# Patient Record
Sex: FEMALE | Race: WHITE
[De-identification: names, ages, dates, MRNs, and addresses within clinical notes are randomized per-mention and may not be internally consistent; named-entity substitution may affect disease eponyms.]

---

## 2019-06-29 ENCOUNTER — HOSPITAL ENCOUNTER (INPATIENT)
Dept: HOSPITAL 47 - EC | Age: 74
LOS: 3 days | Discharge: HOME | DRG: 690 | End: 2019-07-02
Payer: MEDICARE

## 2019-06-29 DIAGNOSIS — N39.0: Primary | ICD-10-CM

## 2019-06-29 DIAGNOSIS — Z87.891: ICD-10-CM

## 2019-06-29 DIAGNOSIS — Z88.6: ICD-10-CM

## 2019-06-29 DIAGNOSIS — E03.9: ICD-10-CM

## 2019-06-29 DIAGNOSIS — Z79.899: ICD-10-CM

## 2019-06-29 DIAGNOSIS — Z80.3: ICD-10-CM

## 2019-06-29 DIAGNOSIS — I10: ICD-10-CM

## 2019-06-29 DIAGNOSIS — Z79.890: ICD-10-CM

## 2019-06-29 DIAGNOSIS — Z87.442: ICD-10-CM

## 2019-06-29 DIAGNOSIS — Z79.01: ICD-10-CM

## 2019-06-29 DIAGNOSIS — K56.7: ICD-10-CM

## 2019-06-29 DIAGNOSIS — Z90.721: ICD-10-CM

## 2019-06-29 DIAGNOSIS — Z87.11: ICD-10-CM

## 2019-06-29 DIAGNOSIS — B96.20: ICD-10-CM

## 2019-06-29 DIAGNOSIS — Z90.79: ICD-10-CM

## 2019-06-29 DIAGNOSIS — Z90.710: ICD-10-CM

## 2019-06-29 DIAGNOSIS — K52.9: ICD-10-CM

## 2019-06-29 DIAGNOSIS — E11.9: ICD-10-CM

## 2019-06-29 DIAGNOSIS — Z82.49: ICD-10-CM

## 2019-06-29 DIAGNOSIS — E66.9: ICD-10-CM

## 2019-06-29 DIAGNOSIS — N83.201: ICD-10-CM

## 2019-06-29 DIAGNOSIS — Z80.8: ICD-10-CM

## 2019-06-29 DIAGNOSIS — I48.91: ICD-10-CM

## 2019-06-29 DIAGNOSIS — Z98.84: ICD-10-CM

## 2019-06-29 LAB
ALBUMIN SERPL-MCNC: 3.4 G/DL (ref 3.5–5)
ALP SERPL-CCNC: 114 U/L (ref 38–126)
ALT SERPL-CCNC: 14 U/L (ref 9–52)
ANION GAP SERPL CALC-SCNC: 9 MMOL/L
AST SERPL-CCNC: 25 U/L (ref 14–36)
BASOPHILS # BLD AUTO: 0 K/UL (ref 0–0.2)
BASOPHILS NFR BLD AUTO: 0 %
BUN SERPL-SCNC: 18 MG/DL (ref 7–17)
CALCIUM SPEC-MCNC: 8.6 MG/DL (ref 8.4–10.2)
CHLORIDE SERPL-SCNC: 109 MMOL/L (ref 98–107)
CO2 SERPL-SCNC: 23 MMOL/L (ref 22–30)
EOSINOPHIL # BLD AUTO: 0.3 K/UL (ref 0–0.7)
EOSINOPHIL NFR BLD AUTO: 3 %
ERYTHROCYTE [DISTWIDTH] IN BLOOD BY AUTOMATED COUNT: 4.15 M/UL (ref 3.8–5.4)
ERYTHROCYTE [DISTWIDTH] IN BLOOD: 14.9 % (ref 11.5–15.5)
GLUCOSE SERPL-MCNC: 105 MG/DL (ref 74–99)
HCT VFR BLD AUTO: 36.6 % (ref 34–46)
HGB BLD-MCNC: 11.8 GM/DL (ref 11.4–16)
LIPASE SERPL-CCNC: 106 U/L (ref 23–300)
LYMPHOCYTES # SPEC AUTO: 1.6 K/UL (ref 1–4.8)
LYMPHOCYTES NFR SPEC AUTO: 20 %
MCH RBC QN AUTO: 28.4 PG (ref 25–35)
MCHC RBC AUTO-ENTMCNC: 32.2 G/DL (ref 31–37)
MCV RBC AUTO: 88.2 FL (ref 80–100)
MONOCYTES # BLD AUTO: 0.8 K/UL (ref 0–1)
MONOCYTES NFR BLD AUTO: 10 %
NEUTROPHILS # BLD AUTO: 4.9 K/UL (ref 1.3–7.7)
NEUTROPHILS NFR BLD AUTO: 63 %
PH UR: 5 [PH] (ref 5–8)
PLATELET # BLD AUTO: 201 K/UL (ref 150–450)
POTASSIUM SERPL-SCNC: 4.3 MMOL/L (ref 3.5–5.1)
PROT SERPL-MCNC: 6.4 G/DL (ref 6.3–8.2)
RBC UR QL: 1 /HPF (ref 0–5)
SODIUM SERPL-SCNC: 141 MMOL/L (ref 137–145)
SP GR UR: 1.02 (ref 1–1.03)
SQUAMOUS UR QL AUTO: <1 /HPF (ref 0–4)
UROBILINOGEN UR QL STRIP: <2 MG/DL (ref ?–2)
WBC # BLD AUTO: 7.8 K/UL (ref 3.8–10.6)

## 2019-06-29 PROCEDURE — 87077 CULTURE AEROBIC IDENTIFY: CPT

## 2019-06-29 PROCEDURE — 87086 URINE CULTURE/COLONY COUNT: CPT

## 2019-06-29 PROCEDURE — 83690 ASSAY OF LIPASE: CPT

## 2019-06-29 PROCEDURE — 96374 THER/PROPH/DIAG INJ IV PUSH: CPT

## 2019-06-29 PROCEDURE — 87186 SC STD MICRODIL/AGAR DIL: CPT

## 2019-06-29 PROCEDURE — 36415 COLL VENOUS BLD VENIPUNCTURE: CPT

## 2019-06-29 PROCEDURE — 96361 HYDRATE IV INFUSION ADD-ON: CPT

## 2019-06-29 PROCEDURE — 85025 COMPLETE CBC W/AUTO DIFF WBC: CPT

## 2019-06-29 PROCEDURE — 74176 CT ABD & PELVIS W/O CONTRAST: CPT

## 2019-06-29 PROCEDURE — 99285 EMERGENCY DEPT VISIT HI MDM: CPT

## 2019-06-29 PROCEDURE — 74181 MRI ABDOMEN W/O CONTRAST: CPT

## 2019-06-29 PROCEDURE — 76705 ECHO EXAM OF ABDOMEN: CPT

## 2019-06-29 PROCEDURE — 76856 US EXAM PELVIC COMPLETE: CPT

## 2019-06-29 PROCEDURE — 96375 TX/PRO/DX INJ NEW DRUG ADDON: CPT

## 2019-06-29 PROCEDURE — 80053 COMPREHEN METABOLIC PANEL: CPT

## 2019-06-29 PROCEDURE — 81001 URINALYSIS AUTO W/SCOPE: CPT

## 2019-06-29 RX ADMIN — ONDANSETRON PRN MG: 2 INJECTION INTRAMUSCULAR; INTRAVENOUS at 23:50

## 2019-06-29 RX ADMIN — CEFAZOLIN SCH MLS/HR: 330 INJECTION, POWDER, FOR SOLUTION INTRAMUSCULAR; INTRAVENOUS at 18:59

## 2019-06-29 RX ADMIN — HYDROMORPHONE HYDROCHLORIDE PRN MG: 1 INJECTION, SOLUTION INTRAMUSCULAR; INTRAVENOUS; SUBCUTANEOUS at 22:37

## 2019-06-29 NOTE — CT
EXAMINATION TYPE: CT abdomen pelvis wo con

 

DATE OF EXAM: 6/29/2019

 

COMPARISON: None

 

HISTORY: right flank pain, hx of stones

 

CT DLP: 776 mGycm

Automated exposure control for dose reduction was used.

 

TECHNIQUE:  Helical acquisition of images was performed from the lung bases through the pelvis.

 

FINDINGS: 

 

LUNG BASES: Diffuse groundglass opacities are seen throughout with bibasilar atelectasis and scattere
d blebs. No hiatal hernia is also appreciated. Surgical change at the gastroesophageal junction is no
alex from prior Ashley-en-Y.

 

LIVER/GB: No bladder is surgically absent. There is prominence of the central biliary system. There i
s incomplete evaluation of the right hepatic dome.

 

PANCREAS: No significant abnormality is seen.

 

SPLEEN: No significant abnormality is seen.

 

ADRENALS: Slight thickening of the adrenal glands may relate to adrenal gland hyperplasia.

 

KIDNEYS: No hydronephrosis or nephrolithiasis. Ill-defined right upper pole renal lesion measures Venancio
nsfield units of fluid. This is incompletely characterized without contrast. Margins are not visible 
and cannot be accurately measured.

 

FREE AIR:  No free air is visualized

 

REPRODUCTIVE ORGANS: Right ovarian cystic lesion measures 2.7 cm and is unexpected in a postmenopausa
l female. Pelvic ultrasound is recommended for further characterization.

 

URINARY BLADDER:  Protrusion of the urinary bladder over the pubic ramus is incidentally seen. No saray
culi are seen within the urinary bladder.

 

PELVIC ADENOPATHY:  No greater than 1 cm short axis lymph node is seen in the abdomen or pelvis.

 

OSSEOUS STRUCTURES:  Diffuse osseous demineralization and degenerative changes of the spine and femor
al acetabular joints are present.

 

BOWEL:  Postsurgical changes from prior partial gastrectomy as described above. Numerous loops of pro
minent small bowel are seen with small bowel feces sign and few loops of bowel wall thickening in the
 left lower quadrant. Findings are nonspecific. Overall there is no evidence of obstruction within th
e bowel.

 

OTHER: Numerous subcutaneous varices are noted within the low pelvis and some degree of vascular occl
usion is suspected that is chronic. Postsurgical change of the ventral abdominal wall is also seen wi
th persistent diastases recti and a small supraumbilical ventral fat filled hernia. Extensive atheros
clerosis of the abdominal aorta and its branches are noted. Pelvic floor relaxation is noted.

 

IMPRESSION: 

1. NO EVIDENCE OF HYDRONEPHROSIS OR NEPHROLITHIASIS.

2. RIGHT OVARIAN LESION IS UNEXPECTED IN A PATIENT OF THIS AGE. BENIGN OR MALIGNANT CYSTIC OVARIAN NE
OPLASM IS POSSIBLE AND PELVIC ULTRASOUND IS RECOMMENDED FOR FURTHER CHARACTERIZATION ON A NONEMERGENT
 BASIS.

3. CENTRAL INTRAHEPATIC BILIARY DUCTAL DILATATION IS INCOMPLETELY VISUALIZED WITHOUT CONTRAST. MRCP W
ITH AND WITHOUT CONTRAST IS RECOMMENDED TO EVALUATE FOR OBSTRUCTING TUMOR OR POSSIBLE CHOLANGITIS IN 
THE APPROPRIATE CLINICAL SETTING.

4. SUBCUTANEOUS VARICES IN THE LOW PELVIS SUGGEST A DEGREE OF CHRONIC VASCULAR OCCLUSION.

5. SCATTERED LOOPS OF PROMINENT SMALL BOWEL WITH FINDINGS OF INCREASED TRANSIT TIME ALL INDICATIVE OF
 ILEUS. UNDERLYING ENTERITIS IS POSSIBLE.

6. GROUNDGLASS OPACITIES BILATERALLY CAN BE SEEN IN PNEUMONITIS, FLUID OVERLOAD, OR FIBROSIS.

## 2019-06-29 NOTE — US
EXAMINATION TYPE: US liver

 

DATE OF EXAM: 6/29/2019

 

COMPARISON: CT dated 6/29/2019

 

CLINICAL HISTORY: Pain. RUQ pain.  CT.  Patient doesn't know if she has a GB or not.  Not NPO.

 

***Limited exam due to overlying bowel gas

 

EXAM MEASUREMENTS:

 

Liver Length:  16.5 cm    

CHD:  1.3 cm

Right Kidney:  8.8 x 4.6 x 4.1  cm

 

 

 

Pancreas:  Obscured by bowel gas

Liver:  Scanned through ribs due to overlying bowel gas= suboptimal visualization.  Left lobe not vis
ualized.  Biliary dilatation seen at hilum.    

Gallbladder:  Surgically absent

**Evidence for sonographic Munoz's sign:  neg

CBD:  Obscured by overlying bowel gas 

CHD: dilated

Right Kidney:  No hydronephrosis or masses seen 

 

 

 

IMPRESSION: As seen on the CT of the same date there is biliary ductal dilatation at the hilum with t
he common hepatic duct measuring up to 1.3 cm. No obstructing calculus is seen on ultrasound. MRCP wi
th and without contrast are recommended for further evaluation to exclude obstructing Klatskin tumor.

## 2019-06-29 NOTE — ED
Abdominal Pain HPI





- General


Chief Complaint: Abdominal Pain


Stated Complaint: Flank pain


Time Seen by Provider: 06/29/19 15:22


Source: patient


Mode of arrival: ambulatory


Limitations: no limitations





- History of Present Illness


Initial Comments: 





Patient is a 73-year-old female presenting to the emergency Department with 

complaints of right flank pain and right upper quadrant pain on and off for 2-3 

days.  Patient states she has had kidney stones in the past and this feels 

similar.  Patient also admits to associated nausea.  Patient denies fever, ch

ills, vomiting, diarrhea, chest pain, shortness of breath.  Patient's last BM 

was this morning and was normal.  Patient does admit to history of hysterectomy,

tubal ligation, 2 gastric bypass surgeries, bladder suspension.  Patient is 

unsure if she has gallbladder and/or appendix.  Patient describes the pain as on

and off for the last 2-3 days but then today has been really constant, about 

8/10.  Patient states she took [place] at home and it did not help with the pain. 

Patient denies any urinary symptoms at this time.  No other complaints at this 

time.





- Related Data


                                Home Medications











 Medication  Instructions  Recorded  Confirmed


 


Amiodarone HCl 200 mg PO DAILY 07/30/16 06/29/19


 


HYDROcodone/APAP 10-325MG [Norco 1 tab PO BID 07/30/16 06/29/19





]   


 


Rivaroxaban [Xarelto] 20 mg PO DAILY 07/30/16 06/29/19


 


Levothyroxine Sodium [Synthroid] 100 mcg PO DAILY 10/29/17 06/29/19


 


Baclofen [Lioresal] 10 mg PO BID 06/29/19 06/29/19


 


Ergocalciferol (Vitamin D2) 50,000 unit PO WE 06/29/19 06/29/19





[Vitamin D2]   


 


Irbesartan [Avapro] 150 mg PO DAILY 06/29/19 06/29/19











                                    Allergies











Allergy/AdvReac Type Severity Reaction Status Date / Time


 


ibuprofen AdvReac  Unknown Verified 06/29/19 15:04














Review of Systems


ROS Statement: 


Those systems with pertinent positive or pertinent negative responses have been 

documented in the HPI.





ROS Other: All systems not noted in ROS Statement are negative.





Past Medical History


Past Medical History: Atrial Fibrillation, Atrial Flutter, Diabetes Mellitus, 

Hypertension, Thyroid Disorder


History of Any Multi-Drug Resistant Organisms: None Reported


Past Surgical History: Hysterectomy, Tubal Ligation


Additional Past Surgical History / Comment(s): gastric bypass


Past Psychological History: No Psychological Hx Reported


Smoking Status: Former smoker


Past Alcohol Use History: None Reported


Past Drug Use History: None Reported





General Exam





- General Exam Comments


Initial Comments: 





GENERAL: Well-appearing, well-nourished and in no acute distress, although 

appears uncomfortable.


HEAD: Atraumatic, normocephalic.


EYES: Pupils equal round and reactive to light, extraocular movements intact, 

sclera anicteric, conjunctiva are normal.


ENT: TMs normal, nares patent, oropharynx clear without exudates.  Moist mucous 

membranes.


NECK: Normal range of motion, supple without lymphadenopathy or JVD.


LUNGS: Breath sounds clear to auscultation bilaterally and equal.  No wheezes 

rales or rhonchi.


HEART: Regular rate and rhythm without murmurs, rubs or gallops.


ABDOMEN: Tender to palpation in the right flank and right upper quadrant.  Soft,

 normoactive bowel sounds.  No guarding, no rebound.  No masses appreciated.


: Deferred 


EXTREMITIES: Normal range of motion, no pitting or edema.  No clubbing or 

cyanosis.


NEUROLOGICAL: Cranial nerves II through XII grossly intact.  Normal speech, 

normal gait.


PSYCH: Normal mood, normal affect.


SKIN: Warm, Dry, normal turgor, no rashes or lesions noted.


Limitations: no limitations





Course


                                   Vital Signs











  06/29/19 06/29/19 06/29/19





  14:48 16:42 18:30


 


Temperature 97.3 F L  


 


Pulse Rate 65 57 L 


 


Respiratory 18 18 18





Rate   


 


Blood Pressure 164/79 152/61 158/75


 


O2 Sat by Pulse 100 97 96





Oximetry   














  06/29/19





  19:27


 


Temperature 98.0 F


 


Pulse Rate 56 L


 


Respiratory 18





Rate 


 


Blood Pressure 163/71


 


O2 Sat by Pulse 97





Oximetry 














Medical Decision Making





- Medical Decision Making





Patient is a 73-year-old female presenting to the ER with right upper quadrant 

and right flank pain 2-3 days.  Patient reports associated nausea as well.  

Patient denies fever, chills, chest pain, cough, shortness of breath, urinary 

symptoms.  Patient is afebrile.  On exam patient is tender in the right upper 

quadrant and right flank area. CBC, CMP is within normal limits.  UA reveals 

nitrate positive, small leukocyte Estrace, 7 wbc's.  Patient's CT abdomen 

reveals no hydronephrosis or kidney stones.  There is a right ovarian lesion 

that could be benign or malignant.  Radiologist recommends pelvic ultrasound is 

a nonemergent basis.  This was discussed with patient.  There is central 

intrahepatic biliary ductal dilation but is not well visualized on the CT.  

Radiologist recommends MRCP with and without contrast to evaluate for 

obstructing tumor or possible cholangitis.  Scattered loops of prominent small 

bowel with findings of increased his time indicative of ileus. Ultrasound of 

liver shows biliary ductal dilation at the hilum with the common hepatic duct 

measuring up to 1.3 cm.  No obstructing calculus can't be seen.  Case was disc

ussed with Dr. Gilliam.  Patient will be admitted under Dr. Sullivan with GI 

consult.  Patient and family are okay with this plan.





- Lab Data


Result diagrams: 


                                 06/29/19 16:30





                                 06/29/19 16:30


                                   Lab Results











  06/29/19 06/29/19 06/29/19 Range/Units





  16:30 16:30 16:38 


 


WBC  7.8    (3.8-10.6)  k/uL


 


RBC  4.15    (3.80-5.40)  m/uL


 


Hgb  11.8    (11.4-16.0)  gm/dL


 


Hct  36.6    (34.0-46.0)  %


 


MCV  88.2    (80.0-100.0)  fL


 


MCH  28.4    (25.0-35.0)  pg


 


MCHC  32.2    (31.0-37.0)  g/dL


 


RDW  14.9    (11.5-15.5)  %


 


Plt Count  201    (150-450)  k/uL


 


Neutrophils %  63    %


 


Lymphocytes %  20    %


 


Monocytes %  10    %


 


Eosinophils %  3    %


 


Basophils %  0    %


 


Neutrophils #  4.9    (1.3-7.7)  k/uL


 


Lymphocytes #  1.6    (1.0-4.8)  k/uL


 


Monocytes #  0.8    (0-1.0)  k/uL


 


Eosinophils #  0.3    (0-0.7)  k/uL


 


Basophils #  0.0    (0-0.2)  k/uL


 


Sodium   141   (137-145)  mmol/L


 


Potassium   4.3   (3.5-5.1)  mmol/L


 


Chloride   109 H   ()  mmol/L


 


Carbon Dioxide   23   (22-30)  mmol/L


 


Anion Gap   9   mmol/L


 


BUN   18 H   (7-17)  mg/dL


 


Creatinine   0.85   (0.52-1.04)  mg/dL


 


Est GFR (CKD-EPI)AfAm   78   (>60 ml/min/1.73 sqM)  


 


Est GFR (CKD-EPI)NonAf   68   (>60 ml/min/1.73 sqM)  


 


Glucose   105 H   (74-99)  mg/dL


 


Calcium   8.6   (8.4-10.2)  mg/dL


 


Total Bilirubin   0.3   (0.2-1.3)  mg/dL


 


AST   25   (14-36)  U/L


 


ALT   14   (9-52)  U/L


 


Alkaline Phosphatase   114   ()  U/L


 


Total Protein   6.4   (6.3-8.2)  g/dL


 


Albumin   3.4 L   (3.5-5.0)  g/dL


 


Lipase   106   ()  U/L


 


Urine Color    Yellow  


 


Urine Appearance    Clear  (Clear)  


 


Urine pH    5.0  (5.0-8.0)  


 


Ur Specific Gravity    1.019  (1.001-1.035)  


 


Urine Protein    Negative  (Negative)  


 


Urine Glucose (UA)    Negative  (Negative)  


 


Urine Ketones    Negative  (Negative)  


 


Urine Blood    Negative  (Negative)  


 


Urine Nitrite    Positive H  (Negative)  


 


Urine Bilirubin    Negative  (Negative)  


 


Urine Urobilinogen    <2.0  (<2.0)  mg/dL


 


Ur Leukocyte Esterase    Small H  (Negative)  


 


Urine RBC    1  (0-5)  /hpf


 


Urine WBC    7 H  (0-5)  /hpf


 


Ur Squamous Epith Cells    <1  (0-4)  /hpf


 


Urine Bacteria    Occasional H  (None)  /hpf


 


Urine Mucus    Rare H  (None)  /hpf














Disposition


Clinical Impression: 


 Abdominal pain, UTI (urinary tract infection)





Disposition: ADMITTED AS IP TO [facility] [place]


Condition: Stable


Instructions (If sedation given, give patient instructions):  Abdominal Pain 

(ED)


Is patient prescribed a controlled substance at d/c from ED?: No


Referrals: 


Naveed Sullivan MD [Primary Care Provider] - 1-2 days


Decision Date: 06/29/19


Decision Time: 18:25

## 2019-06-30 RX ADMIN — LOSARTAN POTASSIUM SCH MG: 50 TABLET, FILM COATED ORAL at 08:39

## 2019-06-30 RX ADMIN — CEFAZOLIN SCH MLS/HR: 330 INJECTION, POWDER, FOR SOLUTION INTRAMUSCULAR; INTRAVENOUS at 17:54

## 2019-06-30 RX ADMIN — BACLOFEN SCH MG: 10 TABLET ORAL at 20:09

## 2019-06-30 RX ADMIN — HYDROMORPHONE HYDROCHLORIDE PRN MG: 1 INJECTION, SOLUTION INTRAMUSCULAR; INTRAVENOUS; SUBCUTANEOUS at 07:44

## 2019-06-30 RX ADMIN — HYDROCODONE BITARTRATE AND ACETAMINOPHEN SCH: 10; 325 TABLET ORAL at 08:45

## 2019-06-30 RX ADMIN — LEVOTHYROXINE SODIUM SCH MCG: 100 TABLET ORAL at 05:41

## 2019-06-30 RX ADMIN — HYDROCODONE BITARTRATE AND ACETAMINOPHEN SCH: 10; 325 TABLET ORAL at 20:27

## 2019-06-30 RX ADMIN — HYDROMORPHONE HYDROCHLORIDE PRN MG: 1 INJECTION, SOLUTION INTRAMUSCULAR; INTRAVENOUS; SUBCUTANEOUS at 15:50

## 2019-06-30 RX ADMIN — AMIODARONE HYDROCHLORIDE SCH MG: 200 TABLET ORAL at 08:39

## 2019-06-30 RX ADMIN — BACLOFEN SCH: 10 TABLET ORAL at 08:39

## 2019-06-30 RX ADMIN — ONDANSETRON PRN MG: 2 INJECTION INTRAMUSCULAR; INTRAVENOUS at 09:40

## 2019-06-30 RX ADMIN — ONDANSETRON PRN MG: 2 INJECTION INTRAMUSCULAR; INTRAVENOUS at 17:59

## 2019-06-30 NOTE — CONS
CONSULTATION



DATE OF DICTATION:

06/30/2019



REQUESTING PHYSICIAN:

Dr. Sullivan.



REASON FOR CONSULTATION:

Severe right flank pain.



The patient is a 73 -year-old pleasant white female came to the emergency room

yesterday complaining of severe right flank pain radiating to the right upper quadrant

area for the last 4 days duration.  The patient states that she was doing a lot of

household work prior to this episode, scrubbing the floors and cleaning the house and a

day later she started experiencing some right-sided flank pain.  Initially thought it

was mostly musculoskeletal in nature and she took rest for a couple of days, but

continued to have persistent pain and now it became intense radiating to the right

upper quadrant area associated with some nausea but no emesis.  She became concerned,

came into the emergency room and subsequently admitted to the hospital for further

evaluation.



In the ER, she did have a CT of the abdomen and pelvis done that showed no evidence of

nephrolithiasis or hydronephrosis.  A small right ovarian lesion 2.5 cm noted.  There

was central intrahepatic biliary ductal dilation, but no CBD dilation noted, scattered

loops of prominent small bowel loops suspicious for enteritis.



The patient denies any nausea, vomiting.  Reports no rectal bleeding or melena.  She

denies any change in her bowel habits.  Her symptoms have no change with eating or

defecation.  She never had these symptoms in the past.



PAST MEDICAL HISTORY:

Significant for obesity, hypertension, hypothyroidism, atrial fibrillation on Xarelto,

diabetes mellitus, hypothyroidism.



PAST SURGICAL HISTORY:

Hysterectomy, tubal ligation, and gastric bypass surgery.



MEDICATIONS:

At home include amiodarone, Norco, Synthroid, Xarelto, Lioresal, Avapro, vitamin D2.



ALLERGIES:

IBUPROFEN.



SOCIAL HISTORY:

No smoking.  No alcohol use.



FAMILY HISTORY:

Unremarkable.



REVIEW OF SYSTEMS:

Cardiopulmonary: No chest pain, shortness of breath.

Genitourinary:  No dysuria or hematuria.  Musculoskeletal unremarkable.  Skin

unremarkable.  Endocrine unremarkable.  Psychiatric unremarkable.  Neurology

unremarkable.  ENT/vision unremarkable.  Constitutional:  No recent weight loss.  No

fever, chills, night sweats.



PHYSICAL EXAMINATION:

She appears comfortable.  No apparent distress.  VITAL SIGNS:  Stable.  Blood pressure

163/71, pulse rate 56, temperature 98.

HEENT examination unremarkable.  Conjunctivae pink. Sclerae anicteric.  Oral cavity no

lesions.

NECK: No jugular venous distention or lymph node enlargement.

CHEST: Clear to auscultation.

HEART:  Regular rate and rhythm.

ABDOMEN soft.  Bowel sounds are positive.  No organomegaly.

EXTREMITIES:  No pedal edema.

SKIN no rashes.

NEUROLOGIC:  Alert and oriented x3.  No focal deficits.



LABS:

From yesterday: WBC 7.3, hemoglobin 11.8, platelets are normal.  Basic metabolic panel

is within normal limits.  ALT, AST, T-bilirubin, alkaline phosphatase normal.  Lipase

is normal.  Urinalysis showed positive nitrate.



IMPRESSION:

1. This lady who presents with severe right flank pain radiating to right upper

    quadrant area for the last 3-4 days duration.  Her symptoms are more

    musculoskeletal in etiology.

2. Dilated intrahepatics with common hepatic duct measuring 1.5 cm on recent imaging

    studies. CT scan of the abdomen as well as ultrasound of the abdomen revealed the

    same.  However, her serum transaminases are within normal limits.  Doubt biliary

    obstruction.  However, as per Radiology, MRCP was recommended to investigate this

    further.



RECOMMENDATIONS:

1. We will schedule the patient for an MRCP to investigate this further.

2. Repeat labs in the morning.

3. Continue with symptomatic treatment for the right flank pain for possible

    musculoskeletal etiology.

4. No indication for any endoscopy intervention at the present time.

5. We will follow the patient closely during the hospital stay.

Thank you for this consultation.



AMYL / YONATANN: [id number] / Job#: [id number]

## 2019-06-30 NOTE — US
EXAMINATION TYPE: US pelvic complete

 

DATE OF EXAM: 6/30/2019

 

COMPARISON: NONE

 

CLINICAL HISTORY: 73-year-old female right ovarian lesion. CT showed rt ov cyst, patient states parti
al hysterectomy and thinks they took left ovary also. 

 

TECHNIQUE:  TA.  Transabdominal sonographic images of the pelvis were acquired.  

Date of LMP:  30+yrs

 

FINDINGS:

 

EXAM MEASUREMENTS:

 

Uterus:  Surgically absent 

Right Ovary:  5.1 x 2.6 x 3.2 cm

Left Ovary:  Surgically absent 

 

Sonographer notes:*had minutes to take images due to full bladder and patient's inability to hold it 
much longer.

 

1. Uterus: Surgically absent

2. Endometrium:  Surgically absent

3. Right Ovary:  2.7 x 1.8 x 2.3cm cyst seen.

4. Left Ovary:  Surgically absent

5. Bilateral Adnexa:  wnl

6. Posterior cul-de-sac:  wnl

 

 

 

IMPRESSION: 

1. Status post hysterectomy. Left ovary also seems to be surgically absent per history.

2. A 2.7 cm simple cyst of the right ovary. In a postmenopausal female, consensus guidelines recommen
ds annual ultrasound surveillance.

## 2019-06-30 NOTE — P.HPIM
History of Present Illness


H&P Date: 06/30/19


Chief Complaint: Abdominal pain





Shantell Lee is a 73-year-old female who presented to [facility] emergency room with a chief complaint of right upper quadrant abdominal

pain and right flank pain she was evaluated in emergency room computed 

tomography scan of the abdomen and pelvis was done and revealed evidence of 

intrahepatic biliary ductal dilatation patient also had evidence of right 

ovarian lesion, she was admitted to medical floor GI consultation and gynecology

consultation was requested.  Patient also had evidence of urinary tract 

infection she was started on IV Rocephin.





Past Medical History


Past Medical History: Atrial Fibrillation, Atrial Flutter, Diabetes Mellitus, 

Hypertension, Osteoarthritis (OA), Thyroid Disorder


Additional Past Medical History / Comment(s): gastric ulcers


History of Any Multi-Drug Resistant Organisms: None Reported


Past Surgical History: Bladder Surgery, Hysterectomy, Tubal Ligation


Additional Past Surgical History / Comment(s): gastric bypassx2, bladder 

suspension.


Past Psychological History: No Psychological Hx Reported


Smoking Status: Former smoker


Past Alcohol Use History: None Reported


Past Drug Use History: None Reported





- Past Family History


  ** Father


Family Medical History: Cancer, CVA/TIA, Myocardial Infarction (MI)





  ** Mother


Family Medical History: Myocardial Infarction (MI)





  ** Sister(s)


Family Medical History: Cancer, Congestive Heart Failure (CHF)


Additional Family Medical History / Comment(s): throat cancer, breast ca





Medications and Allergies


                                Home Medications











 Medication  Instructions  Recorded  Confirmed  Type


 


Amiodarone HCl 200 mg PO DAILY 07/30/16 06/29/19 History


 


HYDROcodone/APAP 10-325MG [Norco 1 tab PO BID 07/30/16 06/29/19 History





]    


 


Rivaroxaban [Xarelto] 20 mg PO DAILY 07/30/16 06/29/19 History


 


Levothyroxine Sodium [Synthroid] 100 mcg PO DAILY 10/29/17 06/29/19 History


 


Baclofen [Lioresal] 10 mg PO BID 06/29/19 06/29/19 History


 


Ergocalciferol (Vitamin D2) 50,000 unit PO WE 06/29/19 06/29/19 History





[Vitamin D2]    


 


Irbesartan [Avapro] 150 mg PO DAILY 06/29/19 06/29/19 History








                                    Allergies











Allergy/AdvReac Type Severity Reaction Status Date / Time


 


ibuprofen AdvReac  Unknown Verified 06/29/19 15:04














Physical Exam


Vitals: 


                                   Vital Signs











  Temp Pulse Pulse Resp BP BP BP


 


 06/30/19 13:27  98.0 F   57 L  16   149/71 


 


 06/30/19 05:00  97.4 F L   58 L  20   127/73 


 


 06/29/19 22:15  97.8 F   66  20    157/83


 


 06/29/19 21:48  97.9 F  62   16  128/55  


 


 06/29/19 19:27  98.0 F  56 L   18  163/71  


 


 06/29/19 18:30     18  158/75  


 


 06/29/19 16:42   57 L   18  152/61  














  Pulse Ox


 


 06/30/19 13:27  96


 


 06/30/19 05:00  98


 


 06/29/19 22:15  97


 


 06/29/19 21:48  97


 


 06/29/19 19:27  97


 


 06/29/19 18:30  96


 


 06/29/19 16:42  97








                                Intake and Output











 06/30/19 06/30/19 06/30/19





 06:59 14:59 22:59


 


Intake Total 100 750 


 


Balance 100 750 


 


Intake:   


 


  Oral 100 750 


 


Other:   


 


  Voiding Method Toilet Toilet 


 


  # Voids 3 2 

















In general patient is alert and oriented 3 in no apparent distress


HEENT head normocephalic and atraumatic


Neck is supple no JVD no goiter no lymphadenopathy


Chest exam reveals a few scattered rhonchi no wheezing


Cardiac exam reveals regular heart sounds no gallops no murmurs


Abdomen is soft nontender no organomegaly was normal bowel sounds


Extremity exam reveals no edema no cyanosis or clubbing


Neurological examination reveals no gross focal deficit





Results


CBC & Chem 7: 


                                 06/29/19 16:30





                                 06/29/19 16:30


Labs: 


                  Abnormal Lab Results - Last 24 Hours (Table)











  06/29/19 06/29/19 Range/Units





  16:30 16:38 


 


Chloride  109 H   ()  mmol/L


 


BUN  18 H   (7-17)  mg/dL


 


Glucose  105 H   (74-99)  mg/dL


 


Albumin  3.4 L   (3.5-5.0)  g/dL


 


Urine Nitrite   Positive H  (Negative)  


 


Ur Leukocyte Esterase   Small H  (Negative)  


 


Urine WBC   7 H  (0-5)  /hpf


 


Urine Bacteria   Occasional H  (None)  /hpf


 


Urine Mucus   Rare H  (None)  /hpf














Thrombosis Risk Factor Assmnt





- Choose All That Apply


Any of the Below Risk Factors Present?: Yes


Each Factor Represents 1 point: Obesity (BMI >25)


Other Risk Factors: Yes


Each Risk Factor Represents 3 Points: Age 75 years or older


Other congenital or acquired thrombophilia - If yes, enter type in comment: No


Thrombosis Risk Factor Assessment Total Risk Factor Score: 4


Thrombosis Risk Factor Assessment Level: Moderate Risk





Assessment and Plan


Plan: 








#1 abdominal pain with nausea 


#2 urinary tract infection


#3 dilated biliary tree


#4 right ovarian lesion


#5 underlying history of hypothyroidism


#6 underlying history of atrial fibrillation maintained on supplemental


#7 underlying history of hypertension








At this time patient is admitted to medical, gastroenterology consultation was 

requested, MRCP was ordered


Patient was started on IV Rocephin for urinary tract infection


Gynecology consultation requested regarding right ovarian lesion


Continue current management will recheck in a.m.

## 2019-07-01 LAB
ALBUMIN SERPL-MCNC: 3.4 G/DL (ref 3.5–5)
ALP SERPL-CCNC: 152 U/L (ref 38–126)
ALT SERPL-CCNC: 47 U/L (ref 9–52)
ANION GAP SERPL CALC-SCNC: 7 MMOL/L
AST SERPL-CCNC: 50 U/L (ref 14–36)
BASOPHILS # BLD AUTO: 0 K/UL (ref 0–0.2)
BASOPHILS NFR BLD AUTO: 1 %
BUN SERPL-SCNC: 14 MG/DL (ref 7–17)
CALCIUM SPEC-MCNC: 8.6 MG/DL (ref 8.4–10.2)
CHLORIDE SERPL-SCNC: 108 MMOL/L (ref 98–107)
CO2 SERPL-SCNC: 26 MMOL/L (ref 22–30)
EOSINOPHIL # BLD AUTO: 0.2 K/UL (ref 0–0.7)
EOSINOPHIL NFR BLD AUTO: 4 %
ERYTHROCYTE [DISTWIDTH] IN BLOOD BY AUTOMATED COUNT: 4.12 M/UL (ref 3.8–5.4)
ERYTHROCYTE [DISTWIDTH] IN BLOOD: 15.4 % (ref 11.5–15.5)
GLUCOSE SERPL-MCNC: 91 MG/DL (ref 74–99)
HCT VFR BLD AUTO: 36.8 % (ref 34–46)
HGB BLD-MCNC: 11.7 GM/DL (ref 11.4–16)
LYMPHOCYTES # SPEC AUTO: 1.1 K/UL (ref 1–4.8)
LYMPHOCYTES NFR SPEC AUTO: 16 %
MCH RBC QN AUTO: 28.4 PG (ref 25–35)
MCHC RBC AUTO-ENTMCNC: 31.7 G/DL (ref 31–37)
MCV RBC AUTO: 89.5 FL (ref 80–100)
MONOCYTES # BLD AUTO: 0.6 K/UL (ref 0–1)
MONOCYTES NFR BLD AUTO: 9 %
NEUTROPHILS # BLD AUTO: 4.6 K/UL (ref 1.3–7.7)
NEUTROPHILS NFR BLD AUTO: 68 %
PLATELET # BLD AUTO: 206 K/UL (ref 150–450)
POTASSIUM SERPL-SCNC: 4.4 MMOL/L (ref 3.5–5.1)
PROT SERPL-MCNC: 6.3 G/DL (ref 6.3–8.2)
SODIUM SERPL-SCNC: 141 MMOL/L (ref 137–145)
WBC # BLD AUTO: 6.8 K/UL (ref 3.8–10.6)

## 2019-07-01 RX ADMIN — HYDROCODONE BITARTRATE AND ACETAMINOPHEN SCH EACH: 10; 325 TABLET ORAL at 21:46

## 2019-07-01 RX ADMIN — LOSARTAN POTASSIUM SCH MG: 50 TABLET, FILM COATED ORAL at 08:07

## 2019-07-01 RX ADMIN — LEVOTHYROXINE SODIUM SCH MCG: 100 TABLET ORAL at 07:01

## 2019-07-01 RX ADMIN — BACLOFEN SCH MG: 10 TABLET ORAL at 21:45

## 2019-07-01 RX ADMIN — HYDROCODONE BITARTRATE AND ACETAMINOPHEN SCH: 10; 325 TABLET ORAL at 08:09

## 2019-07-01 RX ADMIN — HYDROMORPHONE HYDROCHLORIDE PRN MG: 1 INJECTION, SOLUTION INTRAMUSCULAR; INTRAVENOUS; SUBCUTANEOUS at 17:30

## 2019-07-01 RX ADMIN — AMIODARONE HYDROCHLORIDE SCH MG: 200 TABLET ORAL at 08:07

## 2019-07-01 RX ADMIN — BACLOFEN SCH MG: 10 TABLET ORAL at 08:07

## 2019-07-01 RX ADMIN — CEFAZOLIN SCH MLS/HR: 330 INJECTION, POWDER, FOR SOLUTION INTRAMUSCULAR; INTRAVENOUS at 17:08

## 2019-07-01 NOTE — P.OBCN
History of Present Illness


Consult date: 07/01/19


Requesting physician: Naveed Sullivan


Reason for consult: ovarian cyst


Chief complaint: Right flank pain


History of present illness: 





This is a pleasant 73-year-old menopausal female that presented to the hospital 

yesterday with complaints of right flank pain patient states she noted the pain 

to be radiating from the right flank to the mid abdomen.  Patient states 

pressure alleviated this discomfort.  Patient denies any changes in 

bowel/urinary function.  Patient has a history of a hysterectomy done years ago 

for what sounds to be endometrial hyperplasia.  At the time total abdominal 

hysterectomy with unilateral salpingo-oophorectomy was performed.


Patient denies vaginal bleeding.  Patient states she is feeling much improved 

today and the pain has resolved.





Review of Systems


Constitutional: Denies chills, Denies fatigue, Denies fever


Cardiovascular: Denies chest pain


Respiratory: Denies dyspnea


Gastrointestinal: Reports nausea, Denies constipation, Denies diarrhea, Denies 

vomiting


Genitourinary: Denies dysuria, Denies urgency


Menstruation: Reports postmenopausal


Musculoskeletal: Denies low back pain





Past Medical History


Past Medical History: Atrial Fibrillation, Atrial Flutter, Diabetes Mellitus, 

Hypertension, Osteoarthritis (OA), Thyroid Disorder


Additional Past Medical History / Comment(s): gastric ulcers


History of Any Multi-Drug Resistant Organisms: None Reported


Past Surgical History: Bladder Surgery, Hysterectomy, Tubal Ligation


Additional Past Surgical History / Comment(s): gastric bypassx2, bladder 

suspension.


Past Psychological History: No Psychological Hx Reported


Smoking Status: Former smoker


Past Alcohol Use History: None Reported


Past Drug Use History: None Reported





- Past Family History


  ** Father


Family Medical History: Cancer, CVA/TIA, Myocardial Infarction (MI)





  ** Mother


Family Medical History: Myocardial Infarction (MI)





  ** Sister(s)


Family Medical History: Cancer, Congestive Heart Failure (CHF)


Additional Family Medical History / Comment(s): throat cancer, breast ca





Medications and Allergies


                                Home Medications











 Medication  Instructions  Recorded  Confirmed  Type


 


Amiodarone HCl 200 mg PO DAILY 07/30/16 06/29/19 History


 


HYDROcodone/APAP 10-325MG [Norco 1 tab PO BID 07/30/16 06/29/19 History





]    


 


Rivaroxaban [Xarelto] 20 mg PO DAILY 07/30/16 06/29/19 History


 


Levothyroxine Sodium [Synthroid] 100 mcg PO DAILY 10/29/17 06/29/19 History


 


Baclofen [Lioresal] 10 mg PO BID 06/29/19 06/29/19 History


 


Ergocalciferol (Vitamin D2) 50,000 unit PO WE 06/29/19 06/29/19 History





[Vitamin D2]    


 


Irbesartan [Avapro] 150 mg PO DAILY 06/29/19 06/29/19 History








                                    Allergies











Allergy/AdvReac Type Severity Reaction Status Date / Time


 


ibuprofen AdvReac  Unknown Verified 06/29/19 15:04














Exam


Osteopathic Statement: *.  No significant issues noted on an osteopathic 

structural exam other than those noted in the History and Physical/Consult.


                                   Vital Signs











  Temp Pulse Resp BP BP Pulse Ox


 


 07/01/19 05:00  97.6 F  60  20  164/72   98


 


 06/30/19 22:25  97.7 F  56 L  20   126/71  93 L


 


 06/30/19 13:27  98.0 F  57 L  16  149/71   96








                                Intake and Output











 06/30/19 07/01/19 07/01/19





 22:59 06:59 14:59


 


Intake Total 140 0 


 


Balance 140 0 


 


Intake:   


 


  Intake, IV Titration 140  





  Amount   


 


    Sodium Chloride 0.9% 1, 140  





    000 ml @ 40 mls/hr IV .   





    Q24H [facility] Rx#:[id number]   


 


  Oral  0 


 


Other:   


 


  Voiding Method  Toilet 


 


  # Voids 1 2 














Targeted physical exam was performed on this date in general this is a well-

nourished well-developed elderly female in no acute distress she notes 

nonlabored breathing her heart has a regular rate and rhythm her breasts are 

noted to be pendulous her abdomen is soft and nontender a vaginal exam is 

deferred at this time.





Results


Result Diagrams: 


                                 06/29/19 16:30





                                 06/29/19 16:30


                      Microbiology - Last 24 Hours (Table)











 06/29/19 16:38 Urine Culture - Preliminary





 Urine,Clean Catch 














Assessment and Plan


(1) Ovarian cyst


Current Visit: Yes   Status: Acute   Code(s): N83.209 - UNSPECIFIED OVARIAN 

CYST, UNSPECIFIED SIDE   SNOMED Code(s): 34780719


   





(2) Ovarian cyst, right


Current Visit: Yes   Status: Acute   Code(s): N83.201 - UNSPECIFIED OVARIAN 

CYST, RIGHT SIDE   SNOMED Code(s): 97769524


   


Plan: 





Ultrasound is reviewed in detail in general it is a 2.7 cm simple appearing 

cyst.  Recommendation per radiology is follow-up as an outpatient.  These 

results are discussed with the patient in detail.  Patient states understanding 

of the plan and is hopeful to be discharged home later today as her pain has 

resolved.


Thank you for the consult

## 2019-07-01 NOTE — MR
MRCP

 

HISTORY: Dilated common bile duct

 

Multiplanar multisequence imaging through the abdomen, three-dimensional reconstructions performed on
 an alternate workstation.

 

Correlation to CT abdomen 6/29/2019

 

The dilated biliary system is again noted as on CT. At the level of the distal common bile duct there
 is question of some abnormal soft tissue density at the level of the ampulla. Common bile duct is di
lated to approximately 16 mm. No luminal filling defect is identified. Patient is status post cholecy
stectomy.

 

Liver is enlarged. Signal drop on out of phase imaging within the liver suggests hepatic steatosis.

 

Probable cortical cyst associated with the right kidney measures 12 mm. The adrenal glands are normal
. Pancreas within normal limits. The spleen is normal. No retroperitoneal adenopathy. Aorta shows nor
mal caliber. Lung bases are clear. There is a hiatal hernia present. Postop changes are noted to the 
stomach.

 

IMPRESSION: Difficult to exclude an ampullary lesion on the basis of this exam. Biliary dilation can 
be due to patient's postcholecystectomy change. Postop images.

## 2019-07-01 NOTE — P.PN
Subjective


Progress Note Date: 07/01/19


Principal diagnosis: 


Flank pain





MRI scheduled for noon today.  Presently without nausea.  Resting comfortably.  

CBC within normal limits.  Afebrile.








Objective





- Vital Signs


Vital signs: 


                                   Vital Signs











Temp  97.6 F   07/01/19 05:00


 


Pulse  60   07/01/19 05:00


 


Resp  20   07/01/19 05:00


 


BP  164/72   07/01/19 05:00


 


Pulse Ox  98   07/01/19 05:00








                                 Intake & Output











 06/30/19 07/01/19 07/01/19





 18:59 06:59 18:59


 


Intake Total 750 140 


 


Balance 750 140 


 


Intake:   


 


  Intake, IV Titration  140 





  Amount   


 


    Sodium Chloride 0.9% 1,  140 





    000 ml @ 40 mls/hr IV .   





    Q24H [facility] Rx#:[id number]   


 


  Oral 750 0 


 


Other:   


 


  Voiding Method Toilet Toilet 


 


  # Voids 1 2 














- Exam


General appearance: The patient is alert, oriented, in no acute distress.


HET: Head is normocephalic and atraumatic.  Pupils are equal and reactive.  

Oropharynx is clear without lesions.


Neck: Supple without lymphadenopathy.  Trachea midline.


Heart: S1 S2.  Regular rate and rhythm.


Lungs: No crackles or wheezes are heard.


Abdomen: Soft, nontender, nondistended with  bowel sounds.  No peritoneal signs.

 No palpable organomegaly or masses.


Extremities: Normal skin color and turgor.  No cyanosis, rash, ulceration, 

clubbing, or edema.  Radial and pedal pulses are 2/4 bilaterally.


Neurological: No focal deficits.  Strength and sensation are grossly intact.








- Labs


CBC & Chem 7: 


                                 07/01/19 11:40





                                 06/29/19 16:30


Labs: 


                      Microbiology - Last 24 Hours (Table)











 06/29/19 16:38 Urine Culture - Preliminary





 Urine,Clean Catch 














Assessment and Plan


(1) Abnormal CT of the abdomen


Narrative/Plan: 


Severe right flank pain with CT abdomen reported dilated intrahepatic duct with 

common bile duct measuring 1.5 cm consistent with ultrasound of the abdomen with

normal transaminases.  Etiology unclear.


Current Visit: Yes   Status: Acute   Code(s): R93.5 - ABN FINDINGS ON DX IMAGING

OF ABD REGIONS, INC RETROPERITON   SNOMED Code(s): 80451330609277542


   





(2) Right flank pain


Current Visit: Yes   Status: Acute   Code(s): R10.9 - UNSPECIFIED ABDOMINAL PAIN

  SNOMED Code(s): 121872481


   


Plan: 


1.  MRCP.  Continue supportive measures.  No indication for endoscopic 

intervention at this time contingent on clinical course.  We'll follow with you.





Assessment and plan a care discussed with Dr. Zhong

## 2019-07-01 NOTE — P.PN
Subjective


Progress Note Date: 07/01/19





Shantell Lee is a 73-year-old female who presented to [facility] emergency room with a chief complaint of right upper quadrant abdominal

pain and right flank pain she was evaluated in emergency room computed 

tomography scan of the abdomen and pelvis was done and revealed evidence of i

ntrahepatic biliary ductal dilatation patient also had evidence of right ovarian

lesion, she was admitted to medical floor GI consultation and gynecology 

consultation was requested.  Patient also had evidence of urinary tract 

infection she was started on IV Rocephin.








On 07/01/2018 patient is alert and oriented 3.  Patient reports that she feels 

improved with her abdominal pain.  Patient denies any nausea or vomiting.  

Patient to undergo MRCP today.  Patient denies chest pain or shortness breath.  

Patient denies nausea vomiting or diarrhea.  Patient denies any urinary burning.

 Patient is maintained on Rocephin for UTI.  Patient was evaluated by OB/GYN for

ovarian lesion





Objective





- Vital Signs


Vital signs: 


                                   Vital Signs











Temp  97.6 F   07/01/19 05:00


 


Pulse  60   07/01/19 05:00


 


Resp  18   07/01/19 15:21


 


BP  164/72   07/01/19 05:00


 


Pulse Ox  98   07/01/19 05:00








                                 Intake & Output











 06/30/19 07/01/19 07/01/19





 18:59 06:59 18:59


 


Intake Total 750 140 


 


Balance 750 140 


 


Intake:   


 


  Intake, IV Titration  140 





  Amount   


 


    Sodium Chloride 0.9% 1,  140 





    000 ml @ 40 mls/hr IV .   





    Q24H [facility] Rx#:[id number]   


 


  Oral 750 0 


 


Other:   


 


  Voiding Method Toilet Toilet 


 


  # Voids 1 2 














- Exam





In general patient is alert and oriented 3 in no apparent distress


HEENT head normocephalic and atraumatic


Neck is supple no JVD no goiter no lymphadenopathy


Chest exam reveals a few scattered rhonchi no wheezing


Cardiac exam reveals regular heart sounds no gallops no murmurs


Abdomen is soft nontender no organomegaly was normal bowel sounds


Extremity exam reveals no edema no cyanosis or clubbing


Neurological examination reveals no gross focal deficit





- Labs


CBC & Chem 7: 


                                 07/01/19 11:40





                                 07/01/19 11:40


Labs: 


                  Abnormal Lab Results - Last 24 Hours (Table)











  07/01/19 Range/Units





  11:40 


 


Chloride  108 H  ()  mmol/L


 


AST  50 H  (14-36)  U/L


 


Alkaline Phosphatase  152 H  ()  U/L


 


Albumin  3.4 L  (3.5-5.0)  g/dL








                      Microbiology - Last 24 Hours (Table)











 06/29/19 16:38 Urine Culture - Preliminary





 Urine,Clean Catch 














Assessment and Plan


Assessment: 








#1 abdominal pain with nausea 


#2 urinary tract infection


#3 dilated biliary tree


#4 right ovarian lesion


#5 underlying history of hypothyroidism


#6 underlying history of atrial fibrillation maintained on coumadin


#7 underlying history of hypertension








At this time patient is admitted to medical, gastroenterology consultation was 

requested, MRCP was ordered


Patient was started on IV Rocephin for urinary tract infection


Patient was evaluated for right ovarian lesion by OB/GYN patient to follow-up 

outpatient





I performed an examination of the patient and discussed their management with 

the Nurse Practitioner.  I have reviewed the Nurse Practitioner's notes and 

agree with the documented findings and plan of care

## 2019-07-02 VITALS — DIASTOLIC BLOOD PRESSURE: 81 MMHG | TEMPERATURE: 98.1 F | HEART RATE: 55 BPM | SYSTOLIC BLOOD PRESSURE: 134 MMHG

## 2019-07-02 VITALS — RESPIRATION RATE: 16 BRPM

## 2019-07-02 LAB
ALBUMIN SERPL-MCNC: 3.1 G/DL (ref 3.5–5)
ALP SERPL-CCNC: 115 U/L (ref 38–126)
ALT SERPL-CCNC: 38 U/L (ref 9–52)
ANION GAP SERPL CALC-SCNC: 5 MMOL/L
AST SERPL-CCNC: 39 U/L (ref 14–36)
BASOPHILS # BLD AUTO: 0 K/UL (ref 0–0.2)
BASOPHILS NFR BLD AUTO: 1 %
BUN SERPL-SCNC: 14 MG/DL (ref 7–17)
CALCIUM SPEC-MCNC: 8.6 MG/DL (ref 8.4–10.2)
CHLORIDE SERPL-SCNC: 107 MMOL/L (ref 98–107)
CO2 SERPL-SCNC: 27 MMOL/L (ref 22–30)
EOSINOPHIL # BLD AUTO: 0.3 K/UL (ref 0–0.7)
EOSINOPHIL NFR BLD AUTO: 5 %
ERYTHROCYTE [DISTWIDTH] IN BLOOD BY AUTOMATED COUNT: 4.03 M/UL (ref 3.8–5.4)
ERYTHROCYTE [DISTWIDTH] IN BLOOD: 15.5 % (ref 11.5–15.5)
GLUCOSE SERPL-MCNC: 109 MG/DL (ref 74–99)
HCT VFR BLD AUTO: 36.5 % (ref 34–46)
HGB BLD-MCNC: 11.2 GM/DL (ref 11.4–16)
LYMPHOCYTES # SPEC AUTO: 1.2 K/UL (ref 1–4.8)
LYMPHOCYTES NFR SPEC AUTO: 21 %
MCH RBC QN AUTO: 27.9 PG (ref 25–35)
MCHC RBC AUTO-ENTMCNC: 30.8 G/DL (ref 31–37)
MCV RBC AUTO: 90.4 FL (ref 80–100)
MONOCYTES # BLD AUTO: 0.6 K/UL (ref 0–1)
MONOCYTES NFR BLD AUTO: 10 %
NEUTROPHILS # BLD AUTO: 3.5 K/UL (ref 1.3–7.7)
NEUTROPHILS NFR BLD AUTO: 61 %
PLATELET # BLD AUTO: 217 K/UL (ref 150–450)
POTASSIUM SERPL-SCNC: 4.5 MMOL/L (ref 3.5–5.1)
PROT SERPL-MCNC: 6 G/DL (ref 6.3–8.2)
SODIUM SERPL-SCNC: 139 MMOL/L (ref 137–145)
WBC # BLD AUTO: 5.7 K/UL (ref 3.8–10.6)

## 2019-07-02 RX ADMIN — LOSARTAN POTASSIUM SCH MG: 50 TABLET, FILM COATED ORAL at 07:59

## 2019-07-02 RX ADMIN — BACLOFEN SCH MG: 10 TABLET ORAL at 08:00

## 2019-07-02 RX ADMIN — AMIODARONE HYDROCHLORIDE SCH MG: 200 TABLET ORAL at 07:59

## 2019-07-02 RX ADMIN — HYDROCODONE BITARTRATE AND ACETAMINOPHEN SCH: 10; 325 TABLET ORAL at 08:01

## 2019-07-02 RX ADMIN — LEVOTHYROXINE SODIUM SCH MCG: 100 TABLET ORAL at 06:21

## 2019-07-02 NOTE — P.DS
Providers


Date of admission: 


06/29/19 19:22





Expected date of discharge: 07/02/19


Attending physician: 


Naveed Sullivan





Consults: 





                                        





06/29/19 18:22


Consult Physician Stat 


   Consulting Provider: Tyra Zhong


   Consult Reason/Comments: Right upper quadrant and flank pain, nausea, pain 

control


   Do you want consulting provider notified?: Yes, Notify in am





06/30/19 15:17


Consult Physician Routine 


   Consulting Provider: Chang Harley


   Consult Reason/Comments: right ovarian lesion


   Do you want consulting provider notified?: Yes











Primary care physician: 


Naveed Laurie





[facility] Course: 





Discharge diagnosis


#1 abdominal pain with nausea 


#2 urinary tract infection


#3 dilated biliary tree


#4 right ovarian lesion


#5 underlying history of hypothyroidism


#6 underlying history of atrial fibrillation maintained on coumadin


#7 underlying history of hypertension





Patient symptoms have resolved.





Pelvic ultrasound completed showing status post hysterectomy.  Left ovary also 

seems to be surgically absent per history.  2.7 cm simple cyst in the right 

ovary.  Guidelines recommend annual ultrasound surveillance in postmenopausal 

female.  Patient was evaluated by OB/GYN services, patient to follow-up 

outpatient for further management





MRCP completed showing difficult to exclude an ampullary lesion on the basis of 

this exam.  Biliary dilation can be due to patient's postcholecystectomy change.


Discussed case with GI services.  Patient has been cleared for discharge but 

patient will need EGD in 1 week patient does not need to stay in the hospital 

until EGD can be completed


Outpatient EGD has been scheduled to [facility] for 07/12/2019 per GI 

services








Hospital course





Shantell Lee is a 73-year-old female who presented to [facility] emergency room with a chief complaint of right upper quadrant abdominal

pain and right flank pain she was evaluated in emergency room computed 

tomography scan of the abdomen and pelvis was done and revealed evidence of 

intrahepatic biliary ductal dilatation patient also had evidence of right 

ovarian lesion, she was admitted to medical floor GI consultation and gynecology

consultation was requested.  Patient also had evidence of urinary tract 

infection she was started on IV Rocephin.








On 07/01/2018 patient is alert and oriented 3.  Patient reports that she feels 

improved with her abdominal pain.  Patient denies any nausea or vomiting.  

Patient to undergo MRCP today.  Patient denies chest pain or shortness breath.  

Patient denies nausea vomiting or diarrhea.  Patient denies any urinary burning.

 Patient is maintained on Rocephin for UTI.  Patient was evaluated by OB/GYN for

ovarian lesion





On 07/02/2019 patient is alert and oriented 3.  Patient's abdominal pain has 

significantly improved.  MRCP was completed which showed difficult disease to 

exclude an ampullary lesion on the basis of exam biliary dilation can be due to 

patient's postcholecystectomy change.  Discussed case with GI services.  Patient

will be scheduled for EGD outpatient on 07/12/2019.  EGD has been scheduled 

prior to discharge.  Patient's heart rate also sustained in the 50s.  Per 

patient her heart rate always is maintained in the 50s.  Patient has been seen 

by cardiology and has had a halter monitor in the past.  Patient is 

asymptomatic.  This time patient denies chest pain or shortness breath.  Patient

denies nausea vomiting or diarrhea.  Patient denies any urinary burning or 

frequency.  Patient will be discharged on Ceftin for one week for urinary tract 

infection.


 patient to hold Xaralto  until after EGD.  This was discussed with patient





I performed an examination of the patient and discussed their management with 

the Nurse Practitioner.  I have reviewed the Nurse Practitioner's notes and 

agree with the documented findings and plan of care


Patient Condition at Discharge: Stable





Plan - Discharge Summary


Discharge Rx Participant: No


New Discharge Prescriptions: 


No Action


   HYDROcodone/APAP 10-325MG [Norco ] 1 tab PO BID


   Amiodarone HCl 200 mg PO DAILY


   Rivaroxaban [Xarelto] 20 mg PO DAILY


   Levothyroxine Sodium [Synthroid] 100 mcg PO DAILY


   Irbesartan [Avapro] 150 mg PO DAILY


   Ergocalciferol (Vitamin D2) [Vitamin D2] 50,000 unit PO WE


   Baclofen [Lioresal] 10 mg PO BID


Discharge Medication List





Amiodarone HCl 200 mg PO DAILY 07/30/16 [History]


HYDROcodone/APAP 10-325MG [Norco ] 1 tab PO BID 07/30/16 [History]


Rivaroxaban [Xarelto] 20 mg PO DAILY 07/30/16 [History]


Levothyroxine Sodium [Synthroid] 100 mcg PO DAILY 10/29/17 [History]


Baclofen [Lioresal] 10 mg PO BID 06/29/19 [History]


Ergocalciferol (Vitamin D2) [Vitamin D2] 50,000 unit PO WE 06/29/19 [History]


Irbesartan [Avapro] 150 mg PO DAILY 06/29/19 [History]








Follow up Appointment(s)/Referral(s): 


Naveed Sullivan MD [Primary Care Provider] - 1-2 days


Patient Instructions/Handouts:  Abdominal Pain (ED)


Activity/Diet/Wound Care/Special Instructions: 


Outpatient EGD at [facility] July 12, 2019 presurgical 

screening will notify patient will additional instructions and arrival time

## 2019-07-02 NOTE — P.PN
Subjective


Progress Note Date: 07/02/19


Principal diagnosis: 


Flank pain





MRI MRCP difficult to exclude an ampullary lesion basis on the exam.  Biliary 

dilatation 16 mm could be secondary to patient's post cholecystectomy state.  No

luminal filling defect defect identified.  Presently without nausea.  Resting 

comfortably.  CBC within normal limits.  Afebrile.








Objective





- Vital Signs


Vital signs: 


                                   Vital Signs











Temp  97.8 F   07/02/19 04:37


 


Pulse  51 L  07/02/19 04:37


 


Resp  16   07/02/19 04:37


 


BP  135/73   07/02/19 04:37


 


Pulse Ox  99   07/02/19 04:37








                                 Intake & Output











 07/01/19 07/02/19 07/02/19





 18:59 06:59 18:59


 


Other:   


 


  # Voids 2 1 














- Exam


General appearance: The patient is alert, oriented, in no acute distress.


HET: Head is normocephalic and atraumatic.  Pupils are equal and reactive.  

Oropharynx is clear without lesions.


Neck: Supple without lymphadenopathy.  Trachea midline.


Heart: S1 S2.  Regular rate and rhythm.


Lungs: No crackles or wheezes are heard.


Abdomen: Soft, nontender, nondistended with  bowel sounds.  No peritoneal signs.

 No palpable organomegaly or masses.


Extremities: Normal skin color and turgor.  No cyanosis, rash, ulceration, 

clubbing, or edema.  Radial and pedal pulses are 2/4 bilaterally.


Neurological: No focal deficits.  Strength and sensation are grossly intact.








- Labs


CBC & Chem 7: 


                                 07/01/19 11:40





                                 07/01/19 11:40


Labs: 


                  Abnormal Lab Results - Last 24 Hours (Table)











  07/01/19 Range/Units





  11:40 


 


Chloride  108 H  ()  mmol/L


 


AST  50 H  (14-36)  U/L


 


Alkaline Phosphatase  152 H  ()  U/L


 


Albumin  3.4 L  (3.5-5.0)  g/dL








                      Microbiology - Last 24 Hours (Table)











 06/29/19 16:38 Urine Culture - Preliminary





 Urine,Clean Catch    Gram Neg Bacilli














Assessment and Plan


(1) Abnormal CT of the abdomen


Narrative/Plan: 


Severe right flank pain with CT abdomen reported dilated intrahepatic duct with 

common bile duct measuring 1.5 cm consistent with ultrasound of the abdomen with

normal transaminases.  Etiology unclear.  MRCP cannot exclude an ampullary 

lesion CBD dilation to 16 mm could be related to postcholecystectomy state.


Current Visit: Yes   Status: Acute   Code(s): R93.5 - ABN FINDINGS ON DX IMAGING

OF ABD REGIONS, INC RETROPERITON   SNOMED Code(s): 86944112523680583


   





(2) Right flank pain


Current Visit: Yes   Status: Acute   Code(s): R10.9 - UNSPECIFIED ABDOMINAL PAIN

  SNOMED Code(s): 855137223


   


Plan: 


1.  Outpatient diagnostic EGD advised to rule out ampullary mass we'll schedule 

for next week.  Patient is agreeable with this plan a care.  Discharge per 

medicine.


Assessment and plan a care discussed with Dr. Stevens

## 2019-07-12 ENCOUNTER — HOSPITAL ENCOUNTER (OUTPATIENT)
Dept: HOSPITAL 47 - ORWHC2ENDO | Age: 74
End: 2019-07-12
Attending: INTERNAL MEDICINE
Payer: MEDICARE

## 2019-07-12 VITALS — BODY MASS INDEX: 33.2 KG/M2

## 2019-07-12 VITALS — RESPIRATION RATE: 17 BRPM | SYSTOLIC BLOOD PRESSURE: 154 MMHG | DIASTOLIC BLOOD PRESSURE: 76 MMHG | HEART RATE: 61 BPM

## 2019-07-12 VITALS — TEMPERATURE: 97.8 F

## 2019-07-12 DIAGNOSIS — I48.91: ICD-10-CM

## 2019-07-12 DIAGNOSIS — K83.8: ICD-10-CM

## 2019-07-12 DIAGNOSIS — R93.5: Primary | ICD-10-CM

## 2019-07-12 DIAGNOSIS — Z79.890: ICD-10-CM

## 2019-07-12 DIAGNOSIS — Z90.710: ICD-10-CM

## 2019-07-12 DIAGNOSIS — M19.90: ICD-10-CM

## 2019-07-12 DIAGNOSIS — Z88.6: ICD-10-CM

## 2019-07-12 DIAGNOSIS — E07.9: ICD-10-CM

## 2019-07-12 DIAGNOSIS — Z79.899: ICD-10-CM

## 2019-07-12 DIAGNOSIS — Z79.01: ICD-10-CM

## 2019-07-12 LAB — GLUCOSE BLD-MCNC: 98 MG/DL (ref 75–99)

## 2019-07-12 PROCEDURE — 43235 EGD DIAGNOSTIC BRUSH WASH: CPT

## 2019-07-12 RX ADMIN — POTASSIUM CHLORIDE SCH MLS: 14.9 INJECTION, SOLUTION INTRAVENOUS at 08:45

## 2019-07-12 RX ADMIN — POTASSIUM CHLORIDE SCH MLS: 14.9 INJECTION, SOLUTION INTRAVENOUS at 08:04

## 2019-07-12 NOTE — P.PCN
Date of Procedure: 07/12/19


Procedure(s) Performed: 


BRIEF HISTORY: Patient is a 73-year-old, pleasant, female, scheduled for an 

upper endoscopy as a part of evaluation of abnormal MRCP that showed a 

questionable lesion in the ampulla.  She was recently admitted hospital with 

right flank pain radiating to the right upper quadrant area for 4 days duration.

 CT of abdomen showed biliary ductal dilation.  Subsequently an MRCP was 

performed.  It showed mild common bile duct dilation but soft tissue density 

noted in the ampullary area and hence she is scheduled for an upper endoscopy to

evaluate further.  The patient has history of gastric bypass surgery done 

several years ago.. 





PROCEDURE PERFORMED: Esophagogastroduodenoscopy.





PREOPERATIVE DIAGNOSIS: Abnormal MRCP showing soft tissue density in the 

ampulla/rule out ampullary neoplasm. 





IV sedation per anesthesia. 





PROCEDURE: After informed consent was obtained, the patient  was brought into 

the endoscopy unit. IV sedation was administered by Anesthesia under continuous 

monitoring. Initially the Olympus GIF-140 video endoscope was inserted into the 

mouth. Esophagus intubated without any difficulty. It was gradually advanced 

into the stomach and gastric pouch was identified and appeared normal.  There 

was evidence of Ashley-en-Y anastomosis.  The afferent loop was normal.  The scope

was advanced into the efferent opening was advanced almost 60 cm and was not 

able to advance further into the duodenum in a retrograde fashion.  The scope 

was then withdrawn into the gastric pouch and esophagus there appeared normal. 

The GE junction was located at 39 cm from the incisors. The esophagus appeared 

normal. There were no erosions or ulcerations seen and the patient tolerated the

procedure well. 





IMPRESSION: 


1.  Evidence of gastric bypass surgery with Ashley-en-Y anastomosis.


2.  Could not visualize duodenum or the ampulla because of gastric bypass 

surgery.





RECOMMENDATIONS: The findings of this examination were discussed with the 

patient as well as a family.  At this time will monitor her labs closely.

## 2021-05-27 ENCOUNTER — HOSPITAL ENCOUNTER (OUTPATIENT)
Dept: HOSPITAL 47 - RADMRIMAIN | Age: 76
Discharge: HOME | End: 2021-05-27
Attending: INTERNAL MEDICINE
Payer: MEDICARE

## 2021-05-27 DIAGNOSIS — M47.816: ICD-10-CM

## 2021-05-27 DIAGNOSIS — M47.817: ICD-10-CM

## 2021-05-27 DIAGNOSIS — M51.36: Primary | ICD-10-CM

## 2021-05-27 PROCEDURE — 72148 MRI LUMBAR SPINE W/O DYE: CPT

## 2021-05-27 NOTE — MR
EXAMINATION TYPE: MR lumbar spine wo con

 

DATE OF EXAM: 5/27/2021

 

COMPARISON: CT 6/29/2019

 

HISTORY: Low back pain, pt c/o pain in hip and right thigh down to knee for 3 months.

 

TECHNIQUE: 

Multiplanar, multisequence images of the lumbar spine were acquired.

 

L1-L2: There is a mild posterior disc bulge causing minimal anterior mass effect on the thecal sac. N
o significant foraminal encroachment.

 

L2-L3: There is facet arthropathy causing some posterior lateral mass effect on the thecal sac. Circu
mferential disc bulge causes mild anterior mass effect on the thecal sac. No significant foraminal en
croachment.

 

L3-L4: Posterior broad-based disc bulge causes mild anterior mass effect on the thecal sac, circumfer
ential extension endplate disc complex encroaches on the neural foramen on the right. Facet arthropat
hy with hypertrophy ligamentum flavum causes posterior lateral mass effect on the thecal sac.

 

L4-L5: Posterior broad-based disc bulge causes mild effacement of anterior thecal sac, circumferentia
l extension of endplate disc complex encroaches minimally on the inferior aspect of the neural forame
n on the right. There is facet arthropathy with hypertrophy of ligamentum flavum causing some posteri
or lateral mass effect on the thecal sac.

 

L5-S1: There is facet arthropathy change present. Small posterior disc bulge contacts anterior thecal
 sac and possibly proximal S1 nerve roots. No significant foraminal encroachment

 

Lumbar segments are intact.  No paraspinal masses are identified.  Conus medullaris has a normal appe
arance. Lumbar vertebral bodies show preserved height and alignment. Is multilevel spondylosis with e
ndplate discogenic marrow signal change, multi endplate Schmorl's node formation. Loss of disc height
 and signal is present at intervertebral levels especially L2-3, L3-4 and L4-5. Multilevel vacuum phe
nomenon present at intervertebral levels. There is no significant spinal stenosis. Cortical cyst asso
ciated with the right kidney upper pole.

 

IMPRESSION:

There is degenerative disc disease and facet arthropathy as described. No significant spinal stenosis
.

## 2022-03-01 ENCOUNTER — HOSPITAL ENCOUNTER (OUTPATIENT)
Dept: HOSPITAL 47 - RADXRMAIN | Age: 77
Discharge: HOME | End: 2022-03-01
Attending: INTERNAL MEDICINE
Payer: MEDICARE
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
## 2022-03-01 DIAGNOSIS — R06.02: ICD-10-CM

## 2022-03-01 DIAGNOSIS — S22.42XA: Primary | ICD-10-CM

## 2022-03-01 PROCEDURE — 71046 X-RAY EXAM CHEST 2 VIEWS: CPT

## 2022-03-01 NOTE — XR
EXAMINATION TYPE: XR chest 2V, XR ribs LT

 

DATE OF EXAM: 3/1/2022

 

COMPARISON: Chest x-ray October 29, 2017

 

HISTORY: Chest and left-sided rib pain after recent fall injury.

 

TECHNIQUE:  Frontal and lateral views of the chest are obtained. A frontal and oblique images left-si
ded ribs.

 

FINDINGS:  There is lateral left mid to lower lung linear scarring and/or atelectasis.  Right lung is
 clear. No pleural effusion or pneumothorax seen bilaterally. The cardiac silhouette size remains wit
hin normal limits with atherosclerotic change aortic knob.   The osseous structures are intact. Amara
cystectomy clips are redemonstrated.

 

Dedicated images of the left-sided ribs show acute minimally displaced fracture of the anterior third
 and fourth ribs. Overlying soft tissues unremarkable.

 

IMPRESSION:  

1. Acute minimally displaced fractures anterior left third and fourth ribs. There is adjacent left la
teral midlung linear atelectasis. No pneumothorax is seen.

## 2023-10-26 ENCOUNTER — HOSPITAL ENCOUNTER (OUTPATIENT)
Dept: HOSPITAL 47 - EC | Age: 78
Setting detail: OBSERVATION
LOS: 4 days | Discharge: HOME | End: 2023-10-30
Attending: INTERNAL MEDICINE | Admitting: INTERNAL MEDICINE
Payer: MEDICARE

## 2023-10-26 DIAGNOSIS — Z98.84: ICD-10-CM

## 2023-10-26 DIAGNOSIS — I48.21: ICD-10-CM

## 2023-10-26 DIAGNOSIS — Z79.890: ICD-10-CM

## 2023-10-26 DIAGNOSIS — Z88.6: ICD-10-CM

## 2023-10-26 DIAGNOSIS — Z87.891: ICD-10-CM
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
## 2023-10-26 DIAGNOSIS — E03.9: ICD-10-CM

## 2023-10-26 DIAGNOSIS — I10: ICD-10-CM

## 2023-10-26 DIAGNOSIS — E11.9: ICD-10-CM

## 2023-10-26 DIAGNOSIS — E78.5: ICD-10-CM

## 2023-10-26 DIAGNOSIS — Z86.73: ICD-10-CM

## 2023-10-26 DIAGNOSIS — Z79.899: ICD-10-CM

## 2023-10-26 DIAGNOSIS — Z79.01: ICD-10-CM

## 2023-10-26 DIAGNOSIS — Z86.19: ICD-10-CM

## 2023-10-26 DIAGNOSIS — S00.83XA: ICD-10-CM

## 2023-10-26 DIAGNOSIS — R55: Primary | ICD-10-CM

## 2023-10-26 LAB
ALBUMIN SERPL-MCNC: 3.3 G/DL (ref 3.5–5)
ALP SERPL-CCNC: 92 U/L (ref 38–126)
ALT SERPL-CCNC: 19 U/L (ref 4–34)
ANION GAP SERPL CALC-SCNC: 12 MMOL/L
APTT BLD: 32.6 SEC (ref 22–30)
AST SERPL-CCNC: 28 U/L (ref 14–36)
BASOPHILS # BLD AUTO: 0 K/UL (ref 0–0.2)
BASOPHILS NFR BLD AUTO: 0 %
BUN SERPL-SCNC: 25 MG/DL (ref 7–17)
CALCIUM SPEC-MCNC: 8.4 MG/DL (ref 8.4–10.2)
CHLORIDE SERPL-SCNC: 109 MMOL/L (ref 98–107)
CO2 SERPL-SCNC: 19 MMOL/L (ref 22–30)
EOSINOPHIL # BLD AUTO: 0.5 K/UL (ref 0–0.7)
EOSINOPHIL NFR BLD AUTO: 5 %
ERYTHROCYTE [DISTWIDTH] IN BLOOD BY AUTOMATED COUNT: 3.68 M/UL (ref 3.8–5.4)
ERYTHROCYTE [DISTWIDTH] IN BLOOD: 19.8 % (ref 11.5–15.5)
GLUCOSE SERPL-MCNC: 121 MG/DL (ref 74–99)
HCT VFR BLD AUTO: 30.9 % (ref 34–46)
HGB BLD-MCNC: 9.8 GM/DL (ref 11.4–16)
INR PPP: 1.5 (ref ?–1.2)
LYMPHOCYTES # SPEC AUTO: 1.8 K/UL (ref 1–4.8)
LYMPHOCYTES NFR SPEC AUTO: 18 %
MAGNESIUM SPEC-SCNC: 2.2 MG/DL (ref 1.6–2.3)
MCH RBC QN AUTO: 26.7 PG (ref 25–35)
MCHC RBC AUTO-ENTMCNC: 31.8 G/DL (ref 31–37)
MCV RBC AUTO: 84 FL (ref 80–100)
MONOCYTES # BLD AUTO: 0.9 K/UL (ref 0–1)
MONOCYTES NFR BLD AUTO: 9 %
NEUTROPHILS # BLD AUTO: 6.5 K/UL (ref 1.3–7.7)
NEUTROPHILS NFR BLD AUTO: 66 %
PLATELET # BLD AUTO: 255 K/UL (ref 150–450)
POTASSIUM SERPL-SCNC: 3.5 MMOL/L (ref 3.5–5.1)
PROT SERPL-MCNC: 6.3 G/DL (ref 6.3–8.2)
PT BLD: 15.7 SEC (ref 10–12.5)
SODIUM SERPL-SCNC: 140 MMOL/L (ref 137–145)
WBC # BLD AUTO: 9.9 K/UL (ref 3.8–10.6)

## 2023-10-26 PROCEDURE — 70551 MRI BRAIN STEM W/O DYE: CPT

## 2023-10-26 PROCEDURE — 36415 COLL VENOUS BLD VENIPUNCTURE: CPT

## 2023-10-26 PROCEDURE — 81001 URINALYSIS AUTO W/SCOPE: CPT

## 2023-10-26 PROCEDURE — 85610 PROTHROMBIN TIME: CPT

## 2023-10-26 PROCEDURE — 97166 OT EVAL MOD COMPLEX 45 MIN: CPT

## 2023-10-26 PROCEDURE — 85025 COMPLETE CBC W/AUTO DIFF WBC: CPT

## 2023-10-26 PROCEDURE — 85730 THROMBOPLASTIN TIME PARTIAL: CPT

## 2023-10-26 PROCEDURE — 71046 X-RAY EXAM CHEST 2 VIEWS: CPT

## 2023-10-26 PROCEDURE — 95816 EEG AWAKE AND DROWSY: CPT

## 2023-10-26 PROCEDURE — 70450 CT HEAD/BRAIN W/O DYE: CPT

## 2023-10-26 PROCEDURE — 93005 ELECTROCARDIOGRAM TRACING: CPT

## 2023-10-26 PROCEDURE — 80053 COMPREHEN METABOLIC PANEL: CPT

## 2023-10-26 PROCEDURE — 93880 EXTRACRANIAL BILAT STUDY: CPT

## 2023-10-26 PROCEDURE — 72125 CT NECK SPINE W/O DYE: CPT

## 2023-10-26 PROCEDURE — 99285 EMERGENCY DEPT VISIT HI MDM: CPT

## 2023-10-26 PROCEDURE — 93306 TTE W/DOPPLER COMPLETE: CPT

## 2023-10-26 PROCEDURE — 83735 ASSAY OF MAGNESIUM: CPT

## 2023-10-26 PROCEDURE — 84484 ASSAY OF TROPONIN QUANT: CPT

## 2023-10-26 PROCEDURE — 90471 IMMUNIZATION ADMIN: CPT

## 2023-10-26 PROCEDURE — 97162 PT EVAL MOD COMPLEX 30 MIN: CPT

## 2023-10-26 PROCEDURE — 94760 N-INVAS EAR/PLS OXIMETRY 1: CPT

## 2023-10-26 PROCEDURE — 90715 TDAP VACCINE 7 YRS/> IM: CPT

## 2023-10-26 RX ADMIN — ACETAMINOPHEN PRN MG: 500 TABLET ORAL at 16:08

## 2023-10-26 RX ADMIN — ATORVASTATIN CALCIUM SCH MG: 40 TABLET, FILM COATED ORAL at 21:28

## 2023-10-26 NOTE — XR
EXAMINATION TYPE: XR chest 2V

 

DATE OF EXAM: 10/26/2023 2:38 PM

 

COMPARISON: Chest radiographs from 3/1/2022

 

TECHNIQUE: XR chest 2V Frontal and lateral views of the chest.

 

CLINICAL INDICATION:Female, 77 years old with history of syncope; 

 

FINDINGS: 

Lungs/Pleura: There is no evidence of pleural effusion, focal consolidation, or pneumothorax.  

Pulmonary vascularity: Unremarkable.

Heart/mediastinum: Cardiomediastinal silhouette is unremarkable.  Atherosclerotic calcifications are 
seen in the aorta.

Musculoskeletal: No acute osseous pathology. Increased thoracic kyphosis.

Other: Loop recorder within the left anterior chest wall.

 

IMPRESSION: 

No acute cardiopulmonary disease/process.

## 2023-10-26 NOTE — ED
General Adult HPI





- General


Stated complaint: Syncope/Fall,on Thinners


Time Seen by Provider: 10/26/23 13:07


Source: patient, RN/MD, EMS, RN notes reviewed


Mode of arrival: EMS


Limitations: no limitations





- History of Present Illness


Initial comments: 





Patient is a pleasant 77-year-old female presenting to the emergency Department 

with reported syncope.  Patient does not recall the episode.  Patient reportedly

was at physical therapy when she passed out.  Patient reportedly did strike her 

head.  Patient does complain of mild discomfort left frontal.  Patient is on 

Xarelto.  Patient does not feel confused other than not remembering the episode.

 No neck or back pain.  No chest pain or dyspnea.  No abdominal pain.





- Related Data


                                Home Medications











 Medication  Instructions  Recorded  Confirmed


 


Amiodarone HCl 200 mg PO QAM 07/30/16 07/12/19


 


HYDROcodone/APAP 10-325MG [Norco 1 tab PO BID 07/30/16 07/12/19





]   


 


Levothyroxine Sodium [Synthroid] 100 mcg PO QAM 10/29/17 07/12/19


 


Baclofen [Lioresal] 10 mg PO BID 06/29/19 07/12/19


 


Ergocalciferol (Vitamin D2) 50,000 unit PO WE 06/29/19 07/12/19





[Vitamin D2]   


 


Irbesartan [Avapro] 150 mg PO QAM 06/29/19 07/12/19








                                  Previous Rx's











 Medication  Instructions  Recorded


 


Rivaroxaban [Xarelto] 20 mg PO DAILY #0 07/02/19











                                    Allergies











Allergy/AdvReac Type Severity Reaction Status Date / Time


 


ibuprofen AdvReac  hx Verified 07/12/19 07:53





   bleeding  





   ulcers  














Review of Systems


ROS Statement: 


Those systems with pertinent positive or pertinent negative responses have been 

documented in the HPI.





ROS Other: All systems not noted in ROS Statement are negative.


Constitutional: Denies: fever


Eyes: Denies: eye pain


ENT: Denies: ear pain


Respiratory: Denies: cough, dyspnea


Cardiovascular: Denies: chest pain


Endocrine: Denies: fatigue


Musculoskeletal: Denies: back pain





Past Medical History


Past Medical History: Atrial Fibrillation, Atrial Flutter, Diabetes Mellitus, 

Hypertension, Osteoarthritis (OA), Thyroid Disorder


Additional Past Medical History / Comment(s): Recent admission to [facility] back pain 

radiating rt abd,nausea, poss lesion on liver and uti",gastric ulcers


History of Any Multi-Drug Resistant Organisms: ESBL


Date of last positivie culture/infection: 7/24/23 ESBL E.coli


MDRO Source:: Urine


Past Surgical History: Bladder Surgery, Hysterectomy, Tubal Ligation


Additional Past Surgical History / Comment(s): gastric bypassx2, bladder 

suspension.


Past Anesthesia/Blood Transfusion Reactions: No Reported Reaction


Additional Past Anesthesia/Blood Transfusion Reaction / Comment(s): no problems 

with prior blood transfusion,very sensitive gag reflex


Past Psychological History: No Psychological Hx Reported


Past Alcohol Use History: None Reported


Additional Past Alcohol Use History / Comment(s): quit smoking 1992,started 

smoking at age 15


Past Drug Use History: None Reported





- Past Family History


  ** Father


Family Medical History: Cancer, CVA/TIA, Myocardial Infarction (MI)





  ** Mother


Family Medical History: Myocardial Infarction (MI)





  ** Sister(s)


Family Medical History: Cancer, Congestive Heart Failure (CHF)


Additional Family Medical History / Comment(s): throat cancer, breast ca





General Exam


Limitations: no limitations


General appearance: alert, in no apparent distress


Head exam: Present: other (Left forehead abrasions and contusions)


Eye exam: Present: normal appearance, PERRL, EOMI


Neck exam: Present: normal inspection.  Absent: tenderness


Respiratory exam: Present: normal lung sounds bilaterally


Cardiovascular Exam: Present: regular rate, normal rhythm


GI/Abdominal exam: Present: soft.  Absent: tenderness


Extremities exam: Present: normal inspection, full ROM.  Absent: tenderness


Back exam: Present: normal inspection


Neurological exam: Present: alert, oriented X3, CN II-XII intact.  Absent: motor

 sensory deficit


  ** Expanded


Neurological exam: Present: protecting the airway


Speech: Present: fluid speech


Cranial nerves: EOM's Intact: Normal


Motor strength exam: RUE: 5, LUE: 5, RLE: 5, LLE: 5


Eye Response: (4) open spontaneously


Motor Response: (6) obeys commands


Verbal Response: (5) oriented


Psychiatric exam: Present: normal affect, normal mood


Skin exam: Present: normal color





Course


                                   Vital Signs











  10/26/23





  13:09


 


Temperature 98.0 F


 


Pulse Rate 65


 


Respiratory 18





Rate 


 


Blood Pressure 111/60


 


O2 Sat by Pulse 99





Oximetry 














EKG Findings





- EKG Results:


EKG: interpreted by ERMD ((Axis.  Septal Q waves.), normal ST/T


EKG shows: atrial fibrillation





Medical Decision Making





- Medical Decision Making





Was pt. sent in by a medical professional or institution (MALIHA Watson, NP, urgent 

care, hospital, or nursing home...) When possible be specific


@  -Patient was at physical therapy


Did you speak to anyone other than the patient for history (EMS, parent, family,

 police, friend...)? What history was obtained from this source 


@  -EMS and family house right history as patient is a poor historian does not 

recall the episode


Did you review nursing and triage notes (agree or disagree)?  Why? 


@  -I reviewed and agree with nursing and triage notes


Were old charts reviewed (outside hosp., previous admission, EMS record, old 

EKG, old radiological studies, urgent care reports/EKG's, nursing home records)?

 Report findings 


@  -Previous chest x-ray reviewed


Differential Diagnosis (chest pain, altered mental status, abdominal pain women,

 abdominal pain men, vaginal bleeding, weakness, fever, dyspnea, syncope, 

headache, dizziness, GI bleed, back pain, seizure, CVA, palpatations, mental 

health, musculoskeletal)? 


@  -Differential Syncope:


Valvular disease, hypertrophic cardiomyopathy, pulmonary embolism, tamponade, 

tachycardia, bradycardia, MI, hypovolemia, hemorrhage, dissection, anemia, 

intracranial hemorrhage, seizure, hypoglycemia, carbon monoxide poisoning, this 

is not meant to be an all-inclusive list.


EKG interpreted by me (3pts min.).


@  -As above


X-rays interpreted by me (1pt min.).


@  -Chest x-ray shows no acute process


CT interpreted by me (1pt min.).


@  -Report reviewed


U/S interpreted by me (1pt. min.).


@  -None done


What testing was considered but not performed or refused? (CT, X-rays, U/S, 

labs)? Why?


@  -None


What meds were considered but not given or refused? Why?


@  -None


Did you discuss the management of the patient with other professionals 

(professionals i.e. MALIHA Watson, NP, lab, RT, psych nurse, , , 

teacher, , )? Give summary


@  -Case was discussed with Dr. Sullivan, who will admit his patient.


Was smoking cessation discussed for >3mins.?


@  -No


Was critical care preformed (if so, how long)?


@  -No


Were there social determinants of health that impacted care today? How? 

(Homelessness, low income, unemployed, alcoholism, drug addiction, 

transportation, low edu. Level, literacy, decrease access to med. care, custodial, 

rehab)?


@  -No


Was there de-escalation of care discussed even if they declined (Discuss DNR or 

withdrawal of care, Hospice)? DNR status


@  -No


What co-morbidities impacted this encounter? (DM, HTN, Smoking, COPD, CAD, 

Cancer, CVA, ARF, Chemo, Hep., AIDS, mental health diagnosis, sleep apnea, 

morbid obesity)?


@  -None


Was patient admitted / discharged? Hospital course, mention meds given and 

route, prescriptions, significant lab abnormalities, going to OR and other 

pertinent info.


@  -Patient reevaluated.  Patient and family updated on results and plan.  

Patient will be admitted.  Admission orders written.


Undiagnosed new problem with uncertain prognosis?


@  -No


Drug Therapy requiring intensive monitoring for toxicity (Heparin, Nitro, 

Insulin, Cardizem)?


@  -No


Were any procedures done?


@  -No


Diagnosis/symptom?


@  -Syncope


Acute, or Chronic, or Acute on Chronic?


@  -Acute


Uncomplicated (without systemic symptoms) or Complicated (systemic symptoms)?


@  -default


Side effects of treatment?


@  -No


Exacerbation, Progression, or Severe Exacerbation?


@  -No


Poses a threat to life or bodily function? How? (Chest pain, USA, MI, pneumonia,

 PE, COPD, DKA, ARF, appy, cholecystitis, CVA, Diverticulitis, Homicidal, 

Suicidal, threat to staff... and all critical care pts)


@  -No





- Lab Data


Result diagrams: 


                                 10/26/23 13:30





                                 10/26/23 13:30


                                   Lab Results











  10/26/23 10/26/23 10/26/23 Range/Units





  13:30 13:30 13:30 


 


WBC  9.9    (3.8-10.6)  k/uL


 


RBC  3.68 L    (3.80-5.40)  m/uL


 


Hgb  9.8 L    (11.4-16.0)  gm/dL


 


Hct  30.9 L    (34.0-46.0)  %


 


MCV  84.0    (80.0-100.0)  fL


 


MCH  26.7    (25.0-35.0)  pg


 


MCHC  31.8    (31.0-37.0)  g/dL


 


RDW  19.8 H    (11.5-15.5)  %


 


Plt Count  255    (150-450)  k/uL


 


MPV  9.0    


 


Neutrophils %  66    %


 


Lymphocytes %  18    %


 


Monocytes %  9    %


 


Eosinophils %  5    %


 


Basophils %  0    %


 


Neutrophils #  6.5    (1.3-7.7)  k/uL


 


Lymphocytes #  1.8    (1.0-4.8)  k/uL


 


Monocytes #  0.9    (0-1.0)  k/uL


 


Eosinophils #  0.5    (0-0.7)  k/uL


 


Basophils #  0.0    (0-0.2)  k/uL


 


Hypochromasia  Marked    


 


Anisocytosis  Slight    


 


Microcytosis  Slight    


 


PT   15.7 H   (10.0-12.5)  sec


 


INR   1.5 H   (<1.2)  


 


APTT   32.6 H   (22.0-30.0)  sec


 


Sodium    140  (137-145)  mmol/L


 


Potassium    3.5  (3.5-5.1)  mmol/L


 


Chloride    109 H  ()  mmol/L


 


Carbon Dioxide    19 L  (22-30)  mmol/L


 


Anion Gap    12  mmol/L


 


BUN    25 H  (7-17)  mg/dL


 


Creatinine    0.94  (0.52-1.04)  mg/dL


 


Est GFR (CKD-EPI)AfAm    67  (>60 ml/min/1.73 sqM)  


 


Est GFR (CKD-EPI)NonAf    58  (>60 ml/min/1.73 sqM)  


 


Glucose    121 H  (74-99)  mg/dL


 


Calcium    8.4  (8.4-10.2)  mg/dL


 


Magnesium    2.2  (1.6-2.3)  mg/dL


 


Total Bilirubin    0.4  (0.2-1.3)  mg/dL


 


AST    28  (14-36)  U/L


 


ALT    19  (4-34)  U/L


 


Alkaline Phosphatase    92  ()  U/L


 


Troponin I     (0.000-0.034)  ng/mL


 


Total Protein    6.3  (6.3-8.2)  g/dL


 


Albumin    3.3 L  (3.5-5.0)  g/dL














  10/26/23 Range/Units





  13:30 


 


WBC   (3.8-10.6)  k/uL


 


RBC   (3.80-5.40)  m/uL


 


Hgb   (11.4-16.0)  gm/dL


 


Hct   (34.0-46.0)  %


 


MCV   (80.0-100.0)  fL


 


MCH   (25.0-35.0)  pg


 


MCHC   (31.0-37.0)  g/dL


 


RDW   (11.5-15.5)  %


 


Plt Count   (150-450)  k/uL


 


MPV   


 


Neutrophils %   %


 


Lymphocytes %   %


 


Monocytes %   %


 


Eosinophils %   %


 


Basophils %   %


 


Neutrophils #   (1.3-7.7)  k/uL


 


Lymphocytes #   (1.0-4.8)  k/uL


 


Monocytes #   (0-1.0)  k/uL


 


Eosinophils #   (0-0.7)  k/uL


 


Basophils #   (0-0.2)  k/uL


 


Hypochromasia   


 


Anisocytosis   


 


Microcytosis   


 


PT   (10.0-12.5)  sec


 


INR   (<1.2)  


 


APTT   (22.0-30.0)  sec


 


Sodium   (137-145)  mmol/L


 


Potassium   (3.5-5.1)  mmol/L


 


Chloride   ()  mmol/L


 


Carbon Dioxide   (22-30)  mmol/L


 


Anion Gap   mmol/L


 


BUN   (7-17)  mg/dL


 


Creatinine   (0.52-1.04)  mg/dL


 


Est GFR (CKD-EPI)AfAm   (>60 ml/min/1.73 sqM)  


 


Est GFR (CKD-EPI)NonAf   (>60 ml/min/1.73 sqM)  


 


Glucose   (74-99)  mg/dL


 


Calcium   (8.4-10.2)  mg/dL


 


Magnesium   (1.6-2.3)  mg/dL


 


Total Bilirubin   (0.2-1.3)  mg/dL


 


AST   (14-36)  U/L


 


ALT   (4-34)  U/L


 


Alkaline Phosphatase   ()  U/L


 


Troponin I  <0.012  (0.000-0.034)  ng/mL


 


Total Protein   (6.3-8.2)  g/dL


 


Albumin   (3.5-5.0)  g/dL














Disposition


Clinical Impression: 


 Syncope





Disposition: ADMITTED AS IP TO THIS [facility]


Is patient prescribed a controlled substance at d/c from ED?: No


Referrals: 


Naveed Sullivan MD [Primary Care Provider] - 1-2 days


Time of Disposition: 14:47

## 2023-10-26 NOTE — US
EXAMINATION TYPE: US carotid duplex BILAT

 

DATE OF EXAM: 10/26/2023

 

COMPARISON: NONE

 

CLINICAL INDICATION: Female, 77 years old with history of syncope; syncope

 

TECHNIQUE: Carotid duplex ultrasound examination. Indirect Doppler criteria was utilized.

 

FINDINGS:

 

EXAM MEASUREMENTS: 

 

RIGHT:  Peak Systolic Velocity (PSV) cm/sec

----- Right CCA:  52.9  

----- Right ICA:  73.0     

----- Right ECA:  116   

ICA/CCA ratio:  1.4    

 

RIGHT:  End Diastole cm/sec

----- Right CCA:  15.2   

----- Right ICA:  21.3      

----- Right ECA:  8.4     

 

LEFT:  Peak Systolic Velocity (PSV) cm/sec

----- Left CCA:  73.1  

----- Left ICA:  103.0   

----- Left ECA:  129  

ICA/CCA ratio:  1.4  

 

LEFT:  End Diastole cm/sec

----- Left CCA:  0.0  

----- Left ICA:  25.7   

----- Left ECA:  10.3 

 

VERTEBRALS (direction of flow):

Right Vertebral: Antegrade

Left Vertebral: Antegrade

 

Rhythm:  Normal

 

SONOGRAPHER NOTES: Mild homogeneous plaque with no stenosis seen

 

 

 

IMPRESSION:  

Less than 50% stenosis of the bilateral carotid bifurcations.   

 

 

 

 

 

 

Criteria for Assigning % of Stenosis / Diameter reduction

(Estimation based on the indirect measurements of the internal carotid artery velocities (ICA PSV).

1.  Normal (no stenosis)=ICA PSV < 125 cm/s: ratio < 2.0: ICA EDV<40 cm/s.

2. Less than 50% stenosis=ICA PSV < 125 cm/s: ratio < 2.0: ICA EDV<40 cm/s.

3.  50 to 69% stenosis=ICA PSV of 125 to 230 cm/s: ration 2.0 ? 4.0: ICA EDV  cm/s.

4.  Greater than 70% stenosis to near occlusion= ICA PSV > 230 cm/s: ratio > 4.0: ICA EDV > 100 cm/s.
 

5.  Near occlusion= ICA PSV velocities may be low or undetectable: variable ratio and ICA EDV.

6.  Total occlusion=unable to detect flow.

## 2023-10-26 NOTE — CT
EXAMINATION TYPE: CT brain cspine wo con

CT DLP: 1345.1 mGycm, Automated exposure control for dose reduction was used.

 

DATE OF EXAM: 10/26/2023 1:53 PM

 

COMPARISON: None.. 

 

CLINICAL INDICATION:Female, 77 years old with history of syncope; syncope

 

TECHNIQUE: 

Brain: Multiple axial CT images of the brain were obtained without IV contrast. 

Cspine: Axial CT images from the skull base to the inferior aspect of T2 we obtained without intraven
ous contrast. Coronal and sagittal reformatted images were also reviewed. 

 

FINDINGS:

 

Brain:

Extra-axial spaces: No abnormal extra-axial fluid collections.

Ventricular system: Ex vacuo dilatation of the right lateral ventricle.

Cerebral parenchyma: Cerebral atrophy. No acute intraparenchymal hemorrhage or mass effect.  The gray
-white junction is well differentiated. Scattered hypoattenuating areas are seen within the white mat
ter.  Encephalomalacia within the right MCA distribution involving the right frontal, temporal, parie
evelia lobes. There is a region of cephalized within the right parietal/occipital watershed region..

Cerebellum: Unremarkable.

Mass effect: No evidence of midline shift.

Intracranial vasculature: Atherosclerotic calcifications of the intracranial vessels.

Soft tissues: Left forearm soft tissue contusion.

Calvarium/osseous structures: No depressed skull fracture.

Paranasal sinuses and mastoid air cells: Clear.

Visualized orbits: Bilateral aphakia

 

Cervical spine:

Fracture: None.

Osseous structures: Disc space narrowing with endplate sclerosis and vacuum disc disease at C5-C6. Mu
ltilevel facet arthropathy.

Vertebral alignment: Within normal limits.

Spinal canal/Neural Foramina: No evidence of significant spinal canal narrowing. No evidence for sign
ificant neural foraminal stenosis.

Neck soft tissues: Prevertebral soft tissues are within normal limits.

Other: The airway is patent. The lung apices are clear. Macrocalcifications within the right thyroid 
lobe.

 

IMPRESSION:

1.  No acute intracranial process.

2.  Remote ischemic injuries within the right MCA distribution and right parietal occipital watershed
 region.

3.  Nonspecific white matter changes, likely secondary to chronic small vessel ischemic disease.

4.  Left forehead soft tissue contusion.

5.  No evidence of cervical spine fracture.

6.  Mild degenerative disc disease at C5-C6.

## 2023-10-27 LAB
ALBUMIN SERPL-MCNC: 3.4 D/DL (ref 3.8–4.9)
ALBUMIN/GLOB SERPL: 1.26 RATIO (ref 1.6–3.17)
ALP SERPL-CCNC: 101 U/L (ref 41–126)
ALT SERPL-CCNC: 16 U/L (ref 8–44)
ANION GAP SERPL CALC-SCNC: 4.4 MMOL/L (ref 4–12)
AST SERPL-CCNC: 19 U/L (ref 13–35)
BASOPHILS # BLD AUTO: 0.07 X 10*3/UL (ref 0–0.1)
BASOPHILS NFR BLD AUTO: 0.9 %
BUN SERPL-SCNC: 20 MG/DL (ref 9–27)
BUN/CREAT SERPL: 20 RATIO (ref 12–20)
CALCIUM SPEC-MCNC: 9 MG/DL (ref 8.7–10.3)
CHLORIDE SERPL-SCNC: 106 MMOL/L (ref 96–109)
CO2 SERPL-SCNC: 32.6 MMOL/L (ref 21.6–31.8)
EOSINOPHIL # BLD AUTO: 0.23 X 10*3/UL (ref 0.04–0.35)
EOSINOPHIL NFR BLD AUTO: 3.1 %
ERYTHROCYTE [DISTWIDTH] IN BLOOD BY AUTOMATED COUNT: 3.71 X 10*6/UL (ref 4.1–5.2)
ERYTHROCYTE [DISTWIDTH] IN BLOOD: 21.8 % (ref 11.5–14.5)
GLOBULIN SER CALC-MCNC: 2.7 D/DL (ref 1.6–3.3)
GLUCOSE BLD-MCNC: 106 MG/DL (ref 70–110)
GLUCOSE BLD-MCNC: 91 MG/DL (ref 70–110)
GLUCOSE SERPL-MCNC: 93 MG/DL (ref 70–110)
HCT VFR BLD AUTO: 31 % (ref 37.2–46.3)
HGB BLD-MCNC: 9.4 D/DL (ref 12–15)
HYALINE CASTS UR QL AUTO: 4 /LPF (ref 0–2)
IMM GRANULOCYTES BLD QL AUTO: 0.3 %
LYMPHOCYTES # SPEC AUTO: 1.9 X 10*3/UL (ref 0.9–5)
LYMPHOCYTES NFR SPEC AUTO: 25.3 %
MCH RBC QN AUTO: 25.3 PG (ref 27–32)
MCHC RBC AUTO-ENTMCNC: 30.3 D/DL (ref 32–37)
MCV RBC AUTO: 83.6 FL (ref 80–97)
MONOCYTES # BLD AUTO: 1.06 X 10*3/UL (ref 0.2–1)
MONOCYTES NFR BLD AUTO: 14.1 %
NEUTROPHILS # BLD AUTO: 4.22 X 10*3/UL (ref 1.8–7.7)
NEUTROPHILS NFR BLD AUTO: 56.3 %
NRBC BLD AUTO-RTO: 0 X 10*3/UL (ref 0–0.01)
PH UR: 5.5 [PH] (ref 5–8)
PLATELET # BLD AUTO: 278 X 10*3/UL (ref 140–440)
POTASSIUM SERPL-SCNC: 4.3 MMOL/L (ref 3.5–5.5)
PROT SERPL-MCNC: 6.1 D/DL (ref 6.2–8.2)
RBC UR QL: <1 /HPF (ref 0–5)
SODIUM SERPL-SCNC: 143 MMOL/L (ref 135–145)
SP GR UR: 1.01 (ref 1–1.03)
SQUAMOUS UR QL AUTO: <1 /HPF (ref 0–4)
UROBILINOGEN UR QL STRIP: <2 MG/DL (ref ?–2)
WBC # BLD AUTO: 7.5 X 10*3/UL (ref 4.5–10)
WBC #/AREA URNS HPF: 6 /HPF (ref 0–5)

## 2023-10-27 RX ADMIN — DONEPEZIL HYDROCHLORIDE SCH MG: 10 TABLET ORAL at 10:10

## 2023-10-27 RX ADMIN — LEVOTHYROXINE SODIUM SCH MCG: 100 TABLET ORAL at 05:52

## 2023-10-27 RX ADMIN — ACETAMINOPHEN PRN MG: 500 TABLET ORAL at 12:20

## 2023-10-27 RX ADMIN — Medication PRN MG: at 03:43

## 2023-10-27 RX ADMIN — Medication SCH MCG: at 10:09

## 2023-10-27 RX ADMIN — RIVAROXABAN SCH MG: 15 TABLET, FILM COATED ORAL at 10:10

## 2023-10-27 RX ADMIN — LOSARTAN POTASSIUM SCH MG: 50 TABLET, FILM COATED ORAL at 10:09

## 2023-10-27 RX ADMIN — ACETAMINOPHEN PRN MG: 500 TABLET ORAL at 03:43

## 2023-10-27 RX ADMIN — THERA TABS SCH EACH: TAB at 10:10

## 2023-10-27 RX ADMIN — ATORVASTATIN CALCIUM SCH MG: 40 TABLET, FILM COATED ORAL at 21:06

## 2023-10-27 RX ADMIN — PANTOPRAZOLE SODIUM SCH MG: 40 TABLET, DELAYED RELEASE ORAL at 06:38

## 2023-10-27 NOTE — EEG
ELECTROENCEPHALOGRAM REPORT



CLINICAL HISTORY:

This is a 77-year-old woman with a history of syncope, who presented to the emergency

department because of a recent syncopal episode.  The video EEG is obtained to evaluate

for seizure and epileptiform activity.



RELEVANT MEDICATIONS:

The patient is not on any antiepileptic drugs.



EEG TYPE:

A routine 21-channel EEG with video using the 10/20 electrode placement system.



DESCRIPTION:

Wakefulness is only obtained.  During awake state, the background consists of 9.5 Hz

activity.  There is no physiological stage II sleep architecture.



There is no focal slowing.



There is moderate amount of left temporal myogenic artifact.



INTERICTAL AND ICTAL:

It appears there is questionable sharply-contoured activity over the left

temporoparietal region, but it is obscured by the myogenic artifact.  No seizures noted

during the study.



ACTIVATION PROCEDURE:

Photic stimulation did not evoke a posterior driving response.  There is no abnormality

during the photic stimulation.



Hyperventilation is not performed.



CLINICAL INTERPRETATION:

This study appears to be normal since there appears to be questionable sharply-

contoured activity over the left temporal and parietal region, but it is obscured by

the myogenic artifact, mostly in the left temporal region.  Otherwise, no focal

slowing, clear epileptiform discharges, or seizure on the study. Recommend a repeat EEG

study.  Clinical correlation is recommended.





MMODL / IJN: [id number] / Job#: [id number]

## 2023-10-27 NOTE — P.PN
Subjective


Progress Note Date: 10/27/23





Shantell Lee, is a 77-year-old female ptient who presented to the ER 

concerns of syncope.  Patient apparently was working with physical therapy when 

she passed out she did strike her head on the left side.  Patient does not 

recall event denies any chest pain leading up to episode.  Patient does have a 

past medical history of atrial fibrillation which she is maintained on Xarelto. 

patient has past medical history of diabetes mellitus, hypertension, 

osteoporosis, thyroid disorder, ESBL E. coli, gastric bypass surgery. Head CT 

was completed showing no acute intracranial process remote ischemic injuries 

within the right MCA distribution and right parietal-occipital watershed region 

nonspecific white when it changes likely secondary to chronic small vessel 

ischemic disease left forehead soft tissue contusion no evidence of cervical 

spine fracture mild degenerative disc disease at C5 and C6.  Chest x-ray 

completed showing no acute cardiopulmonary disease.  Carotid Doppler completed 

showing less than 50% stenosis of bilateral carotid bifurcations.  Troponins 

negative 3.  At this time will consult cardiology and neurology services.  2-D 

echo in urinary analysis will be ordered.





 on 10/27/2023 patient is alert and oriented 3.  2-D echo ordered.  Awaiting 

further input from consulting providers.  Patient denies chest pain or shortness

of breath.  Patient denies nausea vomiting or diarrhea.  Patient denies any 

urinary burning or frequency.  Vital signs temp 97.9, heart rate 78, respiratory

rate 16, blood pressure 121/75 and pulse ox 97% on room aira








Objective





- Vital Signs


Vital signs: 


                                   Vital Signs











Temp  97.9 F   10/27/23 07:56


 


Pulse  78   10/27/23 07:56


 


Resp  16   10/27/23 07:56


 


BP  121/75   10/27/23 07:56


 


Pulse Ox  97   10/27/23 07:56


 


FiO2      








                                 Intake & Output











 10/26/23 10/27/23 10/27/23





 18:59 06:59 18:59


 


Weight 58.967 kg 58.967 kg 


 


Other:   


 


  Voiding Method  Diaper Diaper


 


  # Voids  2 














- Exam














Head normocephalic


Neck supple


Lungs clear to auscultation bilaterally no wheezing or crackles


Heart regular rate and rhythm S1-S2, no rub or gallop


Abdomen is soft nontender nondistended positive bowel sounds no 

hepatosplenomegaly


Extremities no edema


Neuro alert and orientated to 3








- Labs


CBC & Chem 7: 


                                 10/26/23 13:30





                                 10/26/23 13:30


Labs: 


                  Abnormal Lab Results - Last 24 Hours (Table)











  10/26/23 10/26/23 10/26/23 Range/Units





  13:30 13:30 13:30 


 


RBC  3.68 L    (3.80-5.40)  m/uL


 


Hgb  9.8 L    (11.4-16.0)  gm/dL


 


Hct  30.9 L    (34.0-46.0)  %


 


RDW  19.8 H    (11.5-15.5)  %


 


PT   15.7 H   (10.0-12.5)  sec


 


INR   1.5 H   (<1.2)  


 


APTT   32.6 H   (22.0-30.0)  sec


 


Chloride    109 H  ()  mmol/L


 


Carbon Dioxide    19 L  (22-30)  mmol/L


 


BUN    25 H  (7-17)  mg/dL


 


Glucose    121 H  (74-99)  mg/dL


 


Albumin    3.3 L  (3.5-5.0)  g/dL














Assessment and Plan


Assessment: 





1.  Syncopal episode





2.  Contusion to left temple area.  Head CT was completed





3.  History of atrial fibrillation maintained on Xarelto





4.  History of ESBL E. coli UTI





5.  History of essential hypertension





6.  History of diabetes mellitus





7.  History of hypothyroidism








DVT prophylaxis  xarelto.  GI prophylaxis Protonix


Cardiology and neurology service is consulted


Carotid Doppler completed


2-D echo ordered


Urinary analysis ordered


Repeat labs ordered

## 2023-10-27 NOTE — P.CRDCN
History of Present Illness


History of present illness: 





HISTORY OF PRESENT ILLNESS:  





This is a 77-year-old female with a past medical history significant for atrial 

fibrillation, loop recorder insertion, hypertension, hyperlipidemia, and SVT. 

Patient follows in the office with Dr. Zhong. We have been asked to see the jermaine

ent in consultation for syncope. Patient examined at the bedside.  Patient 

presented to the hospital after a syncopal episode.  The patient states she does

not exactly remember passing out.  She denies having any symptoms prior to this.

 She currently denies any chest pain or pressure.  Denies any shortness of 

breath.  She states there was a family member with her who tried to catch her 

but she fell. 





* EKG reveals atrial fibrillation with controlled ventricular rate


* Chest x-ray: Negative for acute process


* Carotid Doppler: Less than 50% stenosis bilaterally


* CT brain: Negative for acute process.  Remote ischemic injuries within the 

  right MCA distribution and right parieto-occipital watershed region.  Left 

  forehead soft tissue contusion.


* Most recent echocardiogram obtained in 2019 revealed ejection fraction 55%, 

  mild TR, mild MR


* Patient underwent Lexiscan stress test in 2019 which was negative for ischemia





REVIEW OF SYSTEMS: 


At the time of my exam:


CONSTITUTIONAL: Denies fever or chills.


HEENT: Denies blurred vision, vision changes, or eye pain. Denies hemoptysis 


CARDIOVASCULAR: Denies chest pain. Denies orthopnea. Denies PND. Denies 

palpitations


RESPIRATORY: Denies shortness of breath. 


GASTROINTESTINAL: Denies abdominal pain. Denies nausea or vomiting. 


HEMATOLOGIC: Denies bleeding disorders.


GENITOURINARY:  Denies any blood in urine.


SKIN: Denies pruitis. Denies rash.





PHYSICAL EXAM: 


VITAL SIGNS: Reviewed.


GENERAL: Well-developed in no acute distress. 


HEENT: Head is normocephalic. Pupils are equal, round. Sclerae anicteric. Mucous

membranes of the mouth are moist. Neck supple. No JVD or thyromegaly


LUNGS: Respirations even and unlabored. Lungs essentially clear to auscultation 

bilaterally.


HEART: Irregular rate and rhythm.  S1 and S2 heard.  Systolic murmur noted


ABDOMEN: Soft. Nondistended. Nontender.


EXTREMITIES: Normal range of motion.  No clubbing or cyanosis.  Peripheral 

pulses intact.  No lower extremity edema


NEUROLOGIC: Awake and alert. Oriented x 3. 





ASSESSMENT: 


Syncope


Permanent atrial fibrillation with controlled ventricular rate


Hypertension


Hyperlipidemia


History of loop recorder implantation, [employer], 2022


History of CVA; right MCA territory





PLAN: 


Continue current cardiac medications


Decrease Lasix to 20 mg daily


Continue oral anticoagulation with Xarelto


Interrogate loop recorder ([employer]). 4S nurses not trained to interrogate de

vice. RN called down to 3S who stated they did not have the staff to come up and

interrogate device today. Will attempt to have device interrogated on Saturday


Continue telemetry monitoring


Neurology consult.  Await recommendations.


Further recommendations pending patient course








Nurse practitioner note has been reviewed by physician. Signing provider agrees 

with the documented findings, assessment, and plan of care. 








Past Medical History


Past Medical History: Atrial Fibrillation, Atrial Flutter, Diabetes Mellitus, 

Hypertension, Osteoarthritis (OA), Thyroid Disorder


Additional Past Medical History / Comment(s): Recent admission to [facility] back pain 

radiating rt abd,nausea, poss lesion on liver and uti",gastric ulcers


History of Any Multi-Drug Resistant Organisms: ESBL


Date of last positivie culture/infection: 7/24/23 ESBL E.coli


MDRO Source:: Urine


Past Surgical History: Bladder Surgery, Hysterectomy, Tubal Ligation


Additional Past Surgical History / Comment(s): gastric bypassx2, bladder 

suspension.


Past Anesthesia/Blood Transfusion Reactions: No Reported Reaction


Additional Past Anesthesia/Blood Transfusion Reaction / Comment(s): no problems 

with prior blood transfusion,very sensitive gag reflex


Past Psychological History: No Psychological Hx Reported


Past Alcohol Use History: None Reported


Additional Past Alcohol Use History / Comment(s): quit smoking 1992,started 

smoking at age 15


Past Drug Use History: None Reported





- Past Family History


  ** Father


Family Medical History: Cancer, CVA/TIA, Myocardial Infarction (MI)





  ** Mother


Family Medical History: Myocardial Infarction (MI)





  ** Sister(s)


Family Medical History: Cancer, Congestive Heart Failure (CHF)


Additional Family Medical History / Comment(s): throat cancer, breast ca





Medications and Allergies


                                Home Medications











 Medication  Instructions  Recorded  Confirmed  Type


 


Levothyroxine Sodium [Synthroid] 100 mcg PO DAILY 10/29/17 10/26/23 History


 


Acetaminophen Tab [Tylenol Tab] 500 mg PO Q6H PRN 10/26/23 10/26/23 History


 


Atorvastatin [Lipitor] 40 mg PO HS 10/26/23 10/26/23 History


 


Cefuroxime [Ceftin] 250 mg PO BID 10/26/23 10/26/23 History


 


Cholecalciferol [Vitamin D3 (25 25 mcg PO DAILY 10/26/23 10/26/23 History





Mcg = 1000 Iu)]    


 


Donepezil [Aricept] 10 mg PO DAILY 10/26/23 10/26/23 History


 


Furosemide [Lasix] 40 mg PO DAILY 10/26/23 10/26/23 History


 


Losartan [Cozaar] 50 mg PO DAILY 10/26/23 10/26/23 History


 


Melatonin 10 mg PO HS PRN 10/26/23 10/26/23 History


 


Multivitamins, Thera [Multivitamin 1 tab PO DAILY 10/26/23 10/26/23 History





(formulary)]    


 


Nitroglycerin Sl Tabs [Nitrostat] 0.4 mg SUBLINGUAL Q5M PRN 10/26/23 10/26/23 

History


 


Pantoprazole [Protonix] 40 mg PO DAILY 10/26/23 10/26/23 History


 


Rivaroxaban [Xarelto] 20 mg PO DAILY 10/26/23 10/26/23 History


 


hydrOXYzine pamoate [hydrOXYzine 25 mg PO HS 10/26/23 10/26/23 History





PAMOATE]    


 


rOPINIRole HCL [Requip] 2 mg PO HS 10/26/23 10/26/23 History


 


traMADol HCL 50 mg PO Q6H PRN 10/26/23 10/26/23 History


 


traZODone HCL [Desyrel] 50 mg PO HS 10/26/23 10/26/23 History








                                    Allergies











Allergy/AdvReac Type Severity Reaction Status Date / Time


 


ibuprofen AdvReac  hx Verified 07/12/19 07:53





   bleeding  





   ulcers  














Physical Exam


Vitals: 


                                   Vital Signs











  Temp Pulse Pulse Resp BP BP BP


 


 10/27/23 12:23  97.8 F   63  16   95/65 


 


 10/27/23 07:56  97.9 F   78  16    121/75


 


 10/27/23 01:30  98.3 F   86  16    101/61


 


 10/26/23 22:00  98.0 F   53 L  16    98/64


 


 10/26/23 21:26   65   18  94/52  


 


 10/26/23 17:48   87   17  97/58  


 


 10/26/23 13:09  98.0 F  65   18  111/60  














  Pulse Ox


 


 10/27/23 12:23  96


 


 10/27/23 07:56  97


 


 10/27/23 01:30  98


 


 10/26/23 22:00  97


 


 10/26/23 21:26  96


 


 10/26/23 17:48  97


 


 10/26/23 13:09  99








                                Intake and Output











 10/26/23 10/27/23 10/27/23





 22:59 06:59 14:59


 


Other:   


 


  Voiding Method  Diaper Diaper


 


  # Voids  2 


 


  Weight 58.967 kg  














Results





                                 10/27/23 06:40





                                 10/27/23 06:40


                                 Cardiac Enzymes











  10/26/23 10/26/23 10/26/23 Range/Units





  13:30 13:30 16:10 


 


AST  28    (14-36)  U/L


 


Troponin I   <0.012  <0.012  (0.000-0.034)  ng/mL














  10/26/23 10/27/23 Range/Units





  19:55 06:40 


 


AST   19  (14-36)  U/L


 


Troponin I  <0.012   (0.000-0.034)  ng/mL








                                   Coagulation











  10/26/23 Range/Units





  13:30 


 


PT  15.7 H  (10.0-12.5)  sec


 


APTT  32.6 H  (22.0-30.0)  sec








                                       CBC











  10/26/23 10/27/23 Range/Units





  13:30 06:40 


 


WBC  9.9  7.50  (3.8-10.6)  k/uL


 


RBC  3.68 L  3.71 L  (3.80-5.40)  m/uL


 


Hgb  9.8 L  9.4 L  (11.4-16.0)  gm/dL


 


Hct  30.9 L  31.0 L  (34.0-46.0)  %


 


Plt Count  255  278  (150-450)  k/uL








                          Comprehensive Metabolic Panel











  10/26/23 10/27/23 Range/Units





  13:30 06:40 


 


Sodium  140  143  (137-145)  mmol/L


 


Potassium  3.5  4.3  (3.5-5.1)  mmol/L


 


Chloride  109 H  106  ()  mmol/L


 


Carbon Dioxide  19 L  32.6 H  (22-30)  mmol/L


 


BUN  25 H  20.0  (7-17)  mg/dL


 


Creatinine  0.94  1.0  (0.52-1.04)  mg/dL


 


Glucose  121 H  93  (74-99)  mg/dL


 


Calcium  8.4  9.0  (8.4-10.2)  mg/dL


 


AST  28  19  (14-36)  U/L


 


ALT  19  16  (4-34)  U/L


 


Alkaline Phosphatase  92  101  ()  U/L


 


Total Protein  6.3  6.1 L  (6.3-8.2)  g/dL


 


Albumin  3.3 L  3.4 L  (3.5-5.0)  g/dL








                               Current Medications











Generic Name Dose Route Start Last Admin





  Trade Name Freq  PRN Reason Stop Dose Admin


 


Acetaminophen  500 mg  10/26/23 15:59  10/27/23 12:20





  Acetaminophen Tab 500 Mg Tab  PO   500 mg





  Q6H PRN   Administration





  for pain, takes with Tramadol  


 


Atorvastatin Calcium  40 mg  10/26/23 21:00  10/26/23 21:28





  Atorvastatin 40 Mg Tab  PO   40 mg





  HS ERIN   Administration


 


Cholecalciferol  25 mcg  10/27/23 09:00  10/27/23 10:09





  Cholecalciferol 25 Mcg (1000 Iu) Tablet  PO   25 mcg





  DAILY ERIN   Administration


 


Donepezil HCl  10 mg  10/27/23 09:00  10/27/23 10:10





  Donepezil 10 Mg Tab  PO   10 mg





  DAILY ERIN   Administration


 


Furosemide  40 mg  10/27/23 09:00  10/27/23 10:10





  Furosemide 40 Mg Tab  PO   40 mg





  DAILY ERIN   Administration


 


Hydroxyzine Pamoate  25 mg  10/26/23 21:00  10/26/23 21:27





  Hydroxyzine Pamoate 25 Mg Cap  PO   25 mg





  HS ERIN   Administration


 


Levothyroxine Sodium  100 mcg  10/27/23 06:30  10/27/23 05:52





  Levothyroxine 100 Mcg Tab  PO   100 mcg





  DAILY@0630 ERIN   Administration


 


Losartan Potassium  50 mg  10/27/23 09:00  10/27/23 10:09





  Losartan 50 Mg Tab  PO   50 mg





  DAILY ERIN   Administration


 


Melatonin  10 mg  10/26/23 15:59  10/27/23 03:43





  Melatonin 5 Mg Tablet  PO   10 mg





  HS PRN   Administration





  sleep  


 


Multivitamins  1 each  10/27/23 09:00  10/27/23 10:10





  Multivitamins, Thera 1 Each Tab  PO   1 each





  DAILY ERIN   Administration


 


Naloxone HCl  0.2 mg  10/26/23 14:53 





  Naloxone 0.4 Mg/Ml 1 Ml Vial  IV  





  Q2M PRN  





  Opioid Reversal  


 


Nitroglycerin  0.4 mg  10/26/23 15:59 





  Nitroglycerin Sl Tabs 0.4 Mg Tab  SUBLINGUAL  





  Q5M PRN  





  Chest Pain  


 


Pantoprazole Sodium  40 mg  10/27/23 07:30  10/27/23 06:38





  Pantoprazole 40 Mg Tablet  PO   40 mg





  [id number] ERIN   Administration


 


Rivaroxaban  15 mg  10/27/23 09:00  10/27/23 10:10





  Rivaroxaban 15 Mg Tab  PO   15 mg





  DAILY ERIN   Administration





  Protocol  


 


Ropinirole HCl  2 mg  10/26/23 21:00  10/26/23 21:28





  Ropinirole Hcl 1 Mg Tab  PO   2 mg





  HS ERIN   Administration


 


Tramadol HCl  50 mg  10/26/23 15:59 





  Tramadol 50 Mg Tab  PO  





  Q6H PRN  





  Pain  


 


Trazodone HCl  50 mg  10/26/23 21:00  10/26/23 21:28





  Trazodone Hcl 50 Mg Tab  PO   50 mg





  HS ERIN   Administration








                                Intake and Output











 10/26/23 10/27/23 10/27/23





 22:59 06:59 14:59


 


Other:   


 


  Voiding Method  Diaper Diaper


 


  # Voids  2 


 


  Weight 58.967 kg  








                                        





                                 10/27/23 06:40 





                                 10/27/23 06:40

## 2023-10-27 NOTE — P.CNNES
History of Present Illness


Consult date: 10/27/23


Requesting physician: Naveed Sullivan


Reason for Consult: syncope


History of Present Illness: 


This is a 77-year-old woman with history of stroke, atrial fibrillation on 

xarelto who presents for a syncopal episode.  She states a few days ago was at 

home and then passed out.  She stated that she was about stand up and walk ar

ound and she felt she was flushed and then she passed out she fell forward.  She

denies any chest pain feeling lightheaded or dizzy prior to episode that 

appeared that she denies being told that she had any jerk of any extremities or 

urinary or bowel incontinence that she remembers and denies any tongue bite or 

shortness of the tongue.  She said that that that episode was witnessed by her 

daughter who she resides with her.  She said that she has history of syncopal 

episodes and unknown cause.  She does have history of stroke in the past.  She 

is on Xarelto for the atrial fibrillation.  She denies of any headache currently

nausea vomiting any new focal while weakness.





Some of the workup during his hospital visit consisted of:


Sodium was 140, calcium is 8.4, magnesium is 2.2, initial serum glucose is 121.


CT of the head is reported as no acute intracranial process.  Remote ischemic 

injury within the right MCA distribution on the right parietal occipital 

watershed region.  Nonspecific white matter changes, likely secondary due to 

chronic small vessel ischemic disease.  Left forehead soft tissue contusion.  I 

personally reviewed the CT and I agree there is no acute or subacute changes at.


CT cervical spine was reported as no evidence of cervical spine fracture.  Mild 

degenerative disc disease at C5-C6.


Carotid duplex is reported as less than 50% stenosis bilateral carotid bifurcat

ion.








Review of Systems


Review of system:  The 12 point system was reviewed and apparent positive and 

negative per HPI.








Past Medical History


Past Medical History: Atrial Fibrillation, Atrial Flutter, Diabetes Mellitus, 

Hypertension, Osteoarthritis (OA), Thyroid Disorder


Additional Past Medical History / Comment(s): Recent admission to [facility] back pain 

radiating rt abd,nausea, poss lesion on liver and uti",gastric ulcers


History of Any Multi-Drug Resistant Organisms: ESBL


Date of last positivie culture/infection: 7/24/23 ESBL E.coli


MDRO Source:: Urine


Past Surgical History: Bladder Surgery, Hysterectomy, Tubal Ligation


Additional Past Surgical History / Comment(s): gastric bypassx2, bladder 

suspension.


Past Anesthesia/Blood Transfusion Reactions: No Reported Reaction


Additional Past Anesthesia/Blood Transfusion Reaction / Comment(s): no problems 

with prior blood transfusion,very sensitive gag reflex


Past Psychological History: No Psychological Hx Reported


Past Alcohol Use History: None Reported


Additional Past Alcohol Use History / Comment(s): quit smoking 1992,started 

smoking at age 15


Past Drug Use History: None Reported





- Past Family History


  ** Father


Family Medical History: Cancer, CVA/TIA, Myocardial Infarction (MI)





  ** Mother


Family Medical History: Myocardial Infarction (MI)





  ** Sister(s)


Family Medical History: Cancer, Congestive Heart Failure (CHF)


Additional Family Medical History / Comment(s): throat cancer, breast ca





Medications and Allergies


                                Home Medications











 Medication  Instructions  Recorded  Confirmed  Type


 


Levothyroxine Sodium [Synthroid] 100 mcg PO DAILY 10/29/17 10/26/23 History


 


Acetaminophen Tab [Tylenol Tab] 500 mg PO Q6H PRN 10/26/23 10/26/23 History


 


Atorvastatin [Lipitor] 40 mg PO HS 10/26/23 10/26/23 History


 


Cefuroxime [Ceftin] 250 mg PO BID 10/26/23 10/26/23 History


 


Cholecalciferol [Vitamin D3 (25 25 mcg PO DAILY 10/26/23 10/26/23 History





Mcg = 1000 Iu)]    


 


Donepezil [Aricept] 10 mg PO DAILY 10/26/23 10/26/23 History


 


Furosemide [Lasix] 40 mg PO DAILY 10/26/23 10/26/23 History


 


Losartan [Cozaar] 50 mg PO DAILY 10/26/23 10/26/23 History


 


Melatonin 10 mg PO HS PRN 10/26/23 10/26/23 History


 


Multivitamins, Thera [Multivitamin 1 tab PO DAILY 10/26/23 10/26/23 History





(formulary)]    


 


Nitroglycerin Sl Tabs [Nitrostat] 0.4 mg SUBLINGUAL Q5M PRN 10/26/23 10/26/23 

History


 


Pantoprazole [Protonix] 40 mg PO DAILY 10/26/23 10/26/23 History


 


Rivaroxaban [Xarelto] 20 mg PO DAILY 10/26/23 10/26/23 History


 


hydrOXYzine pamoate [hydrOXYzine 25 mg PO HS 10/26/23 10/26/23 History





PAMOATE]    


 


rOPINIRole HCL [Requip] 2 mg PO HS 10/26/23 10/26/23 History


 


traMADol HCL 50 mg PO Q6H PRN 10/26/23 10/26/23 History


 


traZODone HCL [Desyrel] 50 mg PO HS 10/26/23 10/26/23 History








                                    Allergies











Allergy/AdvReac Type Severity Reaction Status Date / Time


 


ibuprofen AdvReac  hx Verified 07/12/19 07:53





   bleeding  





   ulcers  














Physical Examination





- Vital Signs


Vital Signs: 


                                   Vital Signs











  Temp Pulse Pulse Resp BP BP BP


 


 10/27/23 12:23  97.8 F   63  16   95/65 


 


 10/27/23 07:56  97.9 F   78  16    121/75


 


 10/27/23 01:30  98.3 F   86  16    101/61


 


 10/26/23 22:00  98.0 F   53 L  16    98/64


 


 10/26/23 21:26   65   18  94/52  


 


 10/26/23 17:48   87   17  97/58  














  Pulse Ox


 


 10/27/23 12:23  96


 


 10/27/23 07:56  97


 


 10/27/23 01:30  98


 


 10/26/23 22:00  97


 


 10/26/23 21:26  96


 


 10/26/23 17:48  97








                                Intake and Output











 10/26/23 10/27/23 10/27/23





 22:59 06:59 14:59


 


Other:   


 


  Voiding Method  Diaper Diaper


 


  # Voids  2 


 


  Weight 58.967 kg  











GENERAL: The patient is lying in bed and is not in acute distress.


HENT: Has bruises on left side of face from fall.





NEUROLOGICAL:


Higher mental function: The patient is awake, alert, oriented to self, place and

 time.  Patient is following commands.  No aphasia and no neglect.


Cranial nerves: The pupils are round, equal and reactive to light and 

accommodation.  Visual fields are full to confrontation throughout.  Extraocular

 movement is intact no nystagmus is noted.  Facial sensation is normal to touch 

throughout.  The facial strength is normal throughout.  Hearing is mildly 

decreased bilaterally to hand rub.   Tongue is midline and moved side-to-side 

without any difficulty.  No dysarthria is noted.  Shoulder shrug is normal bila

terally.


Motor: The strength is left upper extremity is 4+ to 5- while left lower 

extremity is 4+.  Otherwise over 5 throughout.  Normal tone and bulk.  


Cerebellum: Normal finger to nose bilaterally.


Sensation: Sensation is normal to touch throughout.


Reflexes (right/left): 2+ throughout.


Plantars are mute bilaterally. 








Results





- Laboratory Findings


CBC and BMP: 


                                 10/27/23 06:40





                                 10/27/23 06:40


Abnormal Lab Findings: 


                                  Abnormal Labs











  10/26/23 10/26/23 10/26/23





  13:30 13:30 13:30


 


RBC  3.68 L  


 


Hgb  9.8 L  


 


Hct  30.9 L  


 


MCH   


 


MCHC   


 


RDW  19.8 H  


 


Monocytes #   


 


PT   15.7 H 


 


INR   1.5 H 


 


APTT   32.6 H 


 


Chloride    109 H


 


Carbon Dioxide    19 L


 


BUN    25 H


 


Est GFR (CKD-EPI)   


 


Glucose    121 H


 


Total Protein   


 


Albumin    3.3 L


 


Albumin/Globulin Ratio   














  10/27/23 10/27/23





  06:40 06:40


 


RBC  3.71 L 


 


Hgb  9.4 L 


 


Hct  31.0 L 


 


MCH  25.3 L 


 


MCHC  30.3 L 


 


RDW  21.8 H 


 


Monocytes #  1.06 H 


 


PT  


 


INR  


 


APTT  


 


Chloride  


 


Carbon Dioxide   32.6 H


 


BUN  


 


Est GFR (CKD-EPI)   57 L


 


Glucose  


 


Total Protein   6.1 L


 


Albumin   3.4 L


 


Albumin/Globulin Ratio   1.26 L














Assessment and Plan


Assessment: 


This is a 77-year-old woman with a syncopal episode at home.





Syncopal episode:  Unknown exact etiology rule out seizure versus cardiac.  

Concern for seizure especially with a history of stroke.


History of syncopal episodes in the past of unknown etiology


History of stroke over the right MCA territory


History of atrial fibrillation on Xarelto


Diabetes mellitus


Hypertension


Hypothyroidism





Plan: 


I ordered a routine EEG.  If EEG is negative recommend prolonged EEG as an 

outpatient especially since the patient has been having repeated syncopal epis

ode.  


I ordered MRI of the brain


2-D echo was ordered and is pending


Ordered orthostatic vitals


Cardiology team is consulted


Continue neuro checks


Cardiac monitoring


We'll defer the rest of the medical management to primary and other specialists





The plan is discussed with patient and her nurse.


Thank you for the consultation.





Dr. Arreola will start neurology service tomorrow.








Time with Patient: Greater than 30

## 2023-10-27 NOTE — P.HPIM
History of Present Illness


H&P Date: 10/26/23








Shantell Lee, is a 77-year-old female patient who presented to the ER 

concerns of syncope.  Patient apparently was working with physical therapy when 

she passed out she did strike her head on the left side.  Patient does not 

recall event denies any chest pain leading up to episode.  Patient does have a 

past medical history of atrial fibrillation which she is maintained on Xarelto. 

patient has past medical history of diabetes mellitus, hypertension, 

osteoporosis, thyroid disorder, ESBL E. coli, gastric bypass surgery. Head CT 

was completed showing no acute intracranial process remote ischemic injuries 

within the right MCA distribution and right parietal-occipital watershed region 

nonspecific white when it changes likely secondary to chronic small vessel 

ischemic disease left forehead soft tissue contusion no evidence of cervical 

spine fracture mild degenerative disc disease at C5 and C6.  Chest x-ray 

completed showing no acute cardiopulmonary disease.  Carotid Doppler completed 

showing less than 50% stenosis of bilateral carotid bifurcations.  Troponins 

negative 3.  At this time will consult cardiology and neurology services.  2-D 

echo in urinary analysis will be ordered.





Review of Systems





Please refer to HPI otherwise unremarkable





Past Medical History


Past Medical History: Atrial Fibrillation, Atrial Flutter, Diabetes Mellitus, 

Hypertension, Osteoarthritis (OA), Thyroid Disorder


Additional Past Medical History / Comment(s): Recent admission to [facility] back pain 

radiating rt abd,nausea, poss lesion on liver and uti",gastric ulcers


History of Any Multi-Drug Resistant Organisms: ESBL


Date of last positivie culture/infection: 7/24/23 ESBL E.coli


MDRO Source:: Urine


Past Surgical History: Bladder Surgery, Hysterectomy, Tubal Ligation


Additional Past Surgical History / Comment(s): gastric bypassx2, bladder 

suspension.


Past Anesthesia/Blood Transfusion Reactions: No Reported Reaction


Additional Past Anesthesia/Blood Transfusion Reaction / Comment(s): no problems 

with prior blood transfusion,very sensitive gag reflex


Past Psychological History: No Psychological Hx Reported


Past Alcohol Use History: None Reported


Additional Past Alcohol Use History / Comment(s): quit smoking 1992,started 

smoking at age 15


Past Drug Use History: None Reported





- Past Family History


  ** Father


Family Medical History: Cancer, CVA/TIA, Myocardial Infarction (MI)





  ** Mother


Family Medical History: Myocardial Infarction (MI)





  ** Sister(s)


Family Medical History: Cancer, Congestive Heart Failure (CHF)


Additional Family Medical History / Comment(s): throat cancer, breast ca





Medications and Allergies


                                Home Medications











 Medication  Instructions  Recorded  Confirmed  Type


 


Levothyroxine Sodium [Synthroid] 100 mcg PO DAILY 10/29/17 10/26/23 History


 


Acetaminophen Tab [Tylenol Tab] 500 mg PO Q6H PRN 10/26/23 10/26/23 History


 


Atorvastatin [Lipitor] 40 mg PO HS 10/26/23 10/26/23 History


 


Cefuroxime [Ceftin] 250 mg PO BID 10/26/23 10/26/23 History


 


Cholecalciferol [Vitamin D3 (25 25 mcg PO DAILY 10/26/23 10/26/23 History





Mcg = 1000 Iu)]    


 


Donepezil [Aricept] 10 mg PO DAILY 10/26/23 10/26/23 History


 


Furosemide [Lasix] 40 mg PO DAILY 10/26/23 10/26/23 History


 


Losartan [Cozaar] 50 mg PO DAILY 10/26/23 10/26/23 History


 


Melatonin 10 mg PO HS PRN 10/26/23 10/26/23 History


 


Multivitamins, Thera [Multivitamin 1 tab PO DAILY 10/26/23 10/26/23 History





(formulary)]    


 


Nitroglycerin Sl Tabs [Nitrostat] 0.4 mg SUBLINGUAL Q5M PRN 10/26/23 10/26/23 

History


 


Pantoprazole [Protonix] 40 mg PO DAILY 10/26/23 10/26/23 History


 


Rivaroxaban [Xarelto] 20 mg PO DAILY 10/26/23 10/26/23 History


 


hydrOXYzine pamoate [hydrOXYzine 25 mg PO HS 10/26/23 10/26/23 History





PAMOATE]    


 


rOPINIRole HCL [Requip] 2 mg PO HS 10/26/23 10/26/23 History


 


traMADol HCL 50 mg PO Q6H PRN 10/26/23 10/26/23 History


 


traZODone HCL [Desyrel] 50 mg PO HS 10/26/23 10/26/23 History








                                    Allergies











Allergy/AdvReac Type Severity Reaction Status Date / Time


 


ibuprofen AdvReac  hx Verified 07/12/19 07:53





   bleeding  





   ulcers  














Physical Exam


Vitals: 


                                   Vital Signs











  Temp Pulse Resp BP Pulse Ox


 


 10/26/23 13:09  98.0 F  65  18  111/60  99








                                Intake and Output











 10/26/23 10/26/23 10/26/23





 06:59 14:59 22:59


 


Other:   


 


  Weight  58.967 kg 














Head normocephalic


Neck supple


Lungs clear to auscultation bilaterally no wheezing or crackles


Heart regular rate and rhythm S1-S2, no rub or gallop


Abdomen is soft nontender nondistended positive bowel sounds no 

hepatosplenomegaly


Extremities no edema


Neuro alert and orientated to 3





Results


CBC & Chem 7: 


                                 10/26/23 13:30





                                 10/26/23 13:30


Labs: 


                  Abnormal Lab Results - Last 24 Hours (Table)











  10/26/23 10/26/23 10/26/23 Range/Units





  13:30 13:30 13:30 


 


RBC  3.68 L    (3.80-5.40)  m/uL


 


Hgb  9.8 L    (11.4-16.0)  gm/dL


 


Hct  30.9 L    (34.0-46.0)  %


 


RDW  19.8 H    (11.5-15.5)  %


 


PT   15.7 H   (10.0-12.5)  sec


 


INR   1.5 H   (<1.2)  


 


APTT   32.6 H   (22.0-30.0)  sec


 


Chloride    109 H  ()  mmol/L


 


Carbon Dioxide    19 L  (22-30)  mmol/L


 


BUN    25 H  (7-17)  mg/dL


 


Glucose    121 H  (74-99)  mg/dL


 


Albumin    3.3 L  (3.5-5.0)  g/dL














Assessment and Plan


Assessment: 





1.  Syncopal episode





2.  Contusion to left temple area.  Head CT was completed





3.  History of atrial fibrillation maintained on Xarelto





4.  History of ESBL E. coli UTI





5.  History of essential hypertension





6.  History of diabetes mellitus





7.  History of hypothyroidism








DVT prophylaxis  xarelto.  GI prophylaxis Protonix


Cardiology and neurology service is consulted


Carotid Doppler completed


2-D echo ordered


Urinary analysis ordered


Repeat labs ordered





Time with Patient: Greater than 30 (Greater than 60% of the total time spent in 

counseling and coordination of care)

## 2023-10-28 LAB
ALBUMIN SERPL-MCNC: 3.4 D/DL (ref 3.8–4.9)
ALBUMIN/GLOB SERPL: 1.21 RATIO (ref 1.6–3.17)
ALP SERPL-CCNC: 103 U/L (ref 41–126)
ALT SERPL-CCNC: 16 U/L (ref 8–44)
ANION GAP SERPL CALC-SCNC: 11.4 MMOL/L (ref 4–12)
AST SERPL-CCNC: 18 U/L (ref 13–35)
BASOPHILS # BLD AUTO: 0.06 X 10*3/UL (ref 0–0.1)
BASOPHILS NFR BLD AUTO: 0.7 %
BUN SERPL-SCNC: 21.4 MG/DL (ref 9–27)
BUN/CREAT SERPL: 21.4 RATIO (ref 12–20)
CALCIUM SPEC-MCNC: 9.1 MG/DL (ref 8.7–10.3)
CHLORIDE SERPL-SCNC: 104 MMOL/L (ref 96–109)
CO2 SERPL-SCNC: 26.6 MMOL/L (ref 21.6–31.8)
EOSINOPHIL # BLD AUTO: 0.13 X 10*3/UL (ref 0.04–0.35)
EOSINOPHIL NFR BLD AUTO: 1.6 %
ERYTHROCYTE [DISTWIDTH] IN BLOOD BY AUTOMATED COUNT: 3.92 X 10*6/UL (ref 4.1–5.2)
ERYTHROCYTE [DISTWIDTH] IN BLOOD: 22 % (ref 11.5–14.5)
GLOBULIN SER CALC-MCNC: 2.8 D/DL (ref 1.6–3.3)
GLUCOSE SERPL-MCNC: 128 MG/DL (ref 70–110)
HCT VFR BLD AUTO: 32.7 % (ref 37.2–46.3)
HGB BLD-MCNC: 10 D/DL (ref 12–15)
IMM GRANULOCYTES BLD QL AUTO: 0.2 %
LYMPHOCYTES # SPEC AUTO: 1.36 X 10*3/UL (ref 0.9–5)
LYMPHOCYTES NFR SPEC AUTO: 16.8 %
MCH RBC QN AUTO: 25.5 PG (ref 27–32)
MCHC RBC AUTO-ENTMCNC: 30.6 D/DL (ref 32–37)
MCV RBC AUTO: 83.4 FL (ref 80–97)
MONOCYTES # BLD AUTO: 0.81 X 10*3/UL (ref 0.2–1)
MONOCYTES NFR BLD AUTO: 10 %
NEUTROPHILS # BLD AUTO: 5.72 X 10*3/UL (ref 1.8–7.7)
NEUTROPHILS NFR BLD AUTO: 70.7 %
NRBC BLD AUTO-RTO: 0 X 10*3/UL (ref 0–0.01)
PLATELET # BLD AUTO: 297 X 10*3/UL (ref 140–440)
POTASSIUM SERPL-SCNC: 4.6 MMOL/L (ref 3.5–5.5)
PROT SERPL-MCNC: 6.2 D/DL (ref 6.2–8.2)
SODIUM SERPL-SCNC: 142 MMOL/L (ref 135–145)
WBC # BLD AUTO: 8.1 X 10*3/UL (ref 4.5–10)

## 2023-10-28 RX ADMIN — THERA TABS SCH EACH: TAB at 08:34

## 2023-10-28 RX ADMIN — PANTOPRAZOLE SODIUM SCH MG: 40 TABLET, DELAYED RELEASE ORAL at 06:38

## 2023-10-28 RX ADMIN — ATORVASTATIN CALCIUM SCH MG: 40 TABLET, FILM COATED ORAL at 20:11

## 2023-10-28 RX ADMIN — Medication SCH MCG: at 08:34

## 2023-10-28 RX ADMIN — RIVAROXABAN SCH MG: 15 TABLET, FILM COATED ORAL at 08:34

## 2023-10-28 RX ADMIN — LOSARTAN POTASSIUM SCH: 50 TABLET, FILM COATED ORAL at 08:34

## 2023-10-28 RX ADMIN — FUROSEMIDE SCH MG: 20 TABLET ORAL at 08:34

## 2023-10-28 RX ADMIN — LEVOTHYROXINE SODIUM SCH MCG: 100 TABLET ORAL at 06:38

## 2023-10-28 RX ADMIN — DONEPEZIL HYDROCHLORIDE SCH MG: 10 TABLET ORAL at 08:34

## 2023-10-28 NOTE — P.PN
Subjective


Progress Note Date: 10/28/23





Patient initially seen by Dr. Jonatan Heart.  Please refer to his note for details.

 Patient is a 77-year-old female came with a syncopal spell.  EEG was done, and 

Dr. Heart felt the patient has left temporal/parietal sharp waves although was 

technically port because of myogenic artifact.  MRI of the brain was ordered.





Patient is laying comfortably in the bed.  Patient states her neck is hurting, 

probably due to the fall.  She says that she has suffered from the falls in the 

past but don't know why she passed out this time.  She has lost a lot of weight,

which she attributes to just losing weight.  She almost looks very cachectic.  

No history of cancer.








Recent testing included:


CT of the head is reported as no acute intracranial process.  Remote ischemic 

injury within the right MCA distribution on the right parietal occipital 

watershed region.  Nonspecific white matter changes, likely secondary due to 

chronic small vessel ischemic disease.  Left forehead soft tissue contusion.  I 

personally reviewed the CT and I agree there is no acute or subacute changes at.


CT cervical spine was reported as no evidence of cervical spine fracture.  Mild 

degenerative disc disease at C5-C6.


Carotid duplex is reported as less than 50% stenosis bilateral carotid 

bifurcation.








Objective





- Vital Signs


Vital signs: 


                                   Vital Signs











Temp  98.0 F   10/28/23 07:16


 


Pulse  78   10/28/23 07:16


 


Resp  18   10/28/23 07:16


 


BP  101/64   10/28/23 08:33


 


Pulse Ox  98   10/28/23 08:49


 


FiO2      








                                 Intake & Output











 10/27/23 10/28/23 10/28/23





 18:59 06:59 18:59


 


Output Total 300 200 


 


Balance -300 -200 


 


Output:   


 


  Urine 300 200 


 


Other:   


 


  Voiding Method Diaper Diaper 


 


  # Voids 4 1 


 


  # Bowel Movements 2  














- Exam





On examination patient is an elderly  female, who appears somewhat 

cachectic, with significant weight loss.  She has loose flabby skin folds 

visible.  Patient knows it is October 2023 and that she is in a hospital in [facility] and name of the current president.  Speech and language functions

are normal.  No aphasia or dysarthria.





On cranial nerve examination, pupils are equal, round and reacting to light, 

visual fields are full on confrontation with no neglect.  Face is symmetric and 

tongue protrudes the midline.  Palatal elevation and sensation normal, hearing 

and shoulder shrug normal.  





On muscle strength testing, there is no pronator drift and the strength is 

normal in arms and legs distally and proximally.





Sensory to touch is equal with no neglect.





No ataxia for finger-to-nose testing.





- Labs


CBC & Chem 7: 


                                 10/28/23 04:32





                                 10/28/23 04:32


Labs: 


                  Abnormal Lab Results - Last 24 Hours (Table)











  10/27/23 10/28/23 10/28/23 Range/Units





  10:00 04:32 04:32 


 


RBC   3.92 L   (4.10-5.20)  X 10*6/uL


 


Hgb   10.0 L   (12.0-15.0)  d/dL


 


Hct   32.7 L   (37.2-46.3)  %


 


MCH   25.5 L   (27.0-32.0)  pg


 


MCHC   30.6 L   (32.0-37.0)  d/dL


 


RDW   22.0 H   (11.5-14.5)  %


 


Est GFR (CKD-EPI)    57 L  (>=60)   


 


BUN/Creatinine Ratio    21.40 H  (12.00-20.00)  Ratio


 


Glucose    128 H  ()  mg/dL


 


Albumin    3.4 L  (3.8-4.9)  d/dL


 


Albumin/Globulin Ratio    1.21 L  (1.60-3.17)  Ratio


 


Urine Appearance  Slightly Cloudy H    (Clear)  


 


Urine WBC  6 H    (0-5)  /hpf


 


Urine Bacteria  Rare H    (None)  /hpf


 


Hyaline Casts  4 H    (0-2)  /lpf


 


Urine Mucus  Rare H    (None)  /hpf














Assessment and Plan


Assessment: 





This is a 77-year-old woman with a syncopal episode at home.





Syncopal episode:  Unknown exact etiology rule out seizure versus cardiac.  

Concern for seizure especially with a history of stroke.


History of syncopal episodes in the past of unknown etiology


History of stroke over the right MCA territory


History of atrial fibrillation on Xarelto


Diabetes mellitus


Hypertension


Hypothyroidism











Plan: 








EEG was performed, which appears to be normal since there appears to be 

questionable sharply controlled activity over the left temporal and parietal 

region, but is obscured by the myogenic artifact, mostly in the left temporal r

egion.  Otherwise no focal slowing, clear epileptiform discharges or seizure on 

the study.  Recommend a repeat EEG study.  Clinical correlation is recommended. 

Dr. Heart has ordered a repeat EEG for Monday, 10/30/2023.


  


Await MRI of the brain


2-D echo was ordered and is pending


Ordered orthostatic vitals


Cardiology team is consulted


Continue neuro checks


Cardiac monitoring


We'll defer the rest of the medical management to primary and other specialists


Neurology will follow.

## 2023-10-28 NOTE — P.PN
Subjective


Progress Note Date: 10/28/23








Shantell Lee, is a 77-year-old female ptient who presented to the ER 

concerns of syncope.  Patient apparently was working with physical therapy when 

she passed out she did strike her head on the left side.  Patient does not 

recall event denies any chest pain leading up to episode.  Patient does have a 

past medical history of atrial fibrillation which she is maintained on Xarelto. 

patient has past medical history of diabetes mellitus, hypertension, 

osteoporosis, thyroid disorder, ESBL E. coli, gastric bypass surgery. Head CT 

was completed showing no acute intracranial process remote ischemic injuries 

within the right MCA distribution and right parietal-occipital watershed region 

nonspecific white when it changes likely secondary to chronic small vessel 

ischemic disease left forehead soft tissue contusion no evidence of cervical 

spine fracture mild degenerative disc disease at C5 and C6.  Chest x-ray 

completed showing no acute cardiopulmonary disease.  Carotid Doppler completed 

showing less than 50% stenosis of bilateral carotid bifurcations.  Troponins 

negative 3.  At this time will consult cardiology and neurology services.  2-D 

echo in urinary analysis will be ordered.





 on 10/27/2023 patient is alert and oriented 3.  2-D echo ordered.  Awaiting 

further input from consulting providers.  Patient denies chest pain or shortness

of breath.  Patient denies nausea vomiting or diarrhea.  Patient denies any 

urinary burning or frequency.  Vital signs temp 97.9, heart rate 78, respiratory

rate 16, blood pressure 121/75 and pulse ox 97% on room aira





On 10/28 2023 patient was seen and examined on the medical floor she is alert 

and oriented 3 in no apparent distress, she is feeling tired and complaining of

headache, otherwise she denies any complaints, there is no fever or chills no 

dizziness, no chest pain or shortness of breath no cough no nausea or vomiting 

no abdominal pain no diarrhea no blood in the stools, no burning with urination 

no frequency or urgency and no hematuria.


Today patient had an episode of nonsustained V. tach 9 beats.  Patient was 

symptomatic at this time, Cardiology will be notified, to assess if cardiac 

arrhythmia is the cause of her episode of syncope.





Objective





- Vital Signs


Vital signs: 


                                   Vital Signs











Temp  98.0 F   10/28/23 07:16


 


Pulse  78   10/28/23 07:16


 


Resp  18   10/28/23 07:16


 


BP  101/64   10/28/23 08:33


 


Pulse Ox  98   10/28/23 08:49


 


FiO2      








                                 Intake & Output











 10/27/23 10/28/23 10/28/23





 18:59 06:59 18:59


 


Output Total 300 200 


 


Balance -300 -200 


 


Output:   


 


  Urine 300 200 


 


Other:   


 


  Voiding Method Diaper Diaper 


 


  # Voids 4 1 


 


  # Bowel Movements 2  














- Exam

















In general patient is alert and oriented x-3 in no distress


HEENT head normocephalic and atraumatic


Neck is supple no JVD no goiter no lymphadenopathy no carotid bruit


Chest examination is clear to auscultation no crackles no wheezing


Cardiac exam reveals regular heart sounds S1 and S2 no gallops no murmurs


Abdomen is soft nontender no organomegaly with normal bowel sounds


Extremity exam reveals no edema no cyanosis or clubbing


Neurological examination reveals no gross focal deficits





- Labs


CBC & Chem 7: 


                                 10/28/23 04:32





                                 10/28/23 04:32


Labs: 


                  Abnormal Lab Results - Last 24 Hours (Table)











  10/27/23 10/27/23 10/27/23 Range/Units





  06:40 06:40 10:00 


 


RBC  3.71 L    (4.10-5.20)  X 10*6/uL


 


Hgb  9.4 L    (12.0-15.0)  d/dL


 


Hct  31.0 L    (37.2-46.3)  %


 


MCH  25.3 L    (27.0-32.0)  pg


 


MCHC  30.3 L    (32.0-37.0)  d/dL


 


RDW  21.8 H    (11.5-14.5)  %


 


Monocytes #  1.06 H    (0.20-1.00)  X 10*3/uL


 


Carbon Dioxide   32.6 H   (21.6-31.8)  mmol/L


 


Est GFR (CKD-EPI)   57 L   (>=60)   


 


BUN/Creatinine Ratio     (12.00-20.00)  Ratio


 


Glucose     ()  mg/dL


 


Total Protein   6.1 L   (6.2-8.2)  d/dL


 


Albumin   3.4 L   (3.8-4.9)  d/dL


 


Albumin/Globulin Ratio   1.26 L   (1.60-3.17)  Ratio


 


Urine Appearance    Slightly Cloudy H  (Clear)  


 


Urine WBC    6 H  (0-5)  /hpf


 


Urine Bacteria    Rare H  (None)  /hpf


 


Hyaline Casts    4 H  (0-2)  /lpf


 


Urine Mucus    Rare H  (None)  /hpf














  10/28/23 10/28/23 Range/Units





  04:32 04:32 


 


RBC  3.92 L   (4.10-5.20)  X 10*6/uL


 


Hgb  10.0 L   (12.0-15.0)  d/dL


 


Hct  32.7 L   (37.2-46.3)  %


 


MCH  25.5 L   (27.0-32.0)  pg


 


MCHC  30.6 L   (32.0-37.0)  d/dL


 


RDW  22.0 H   (11.5-14.5)  %


 


Monocytes #    (0.20-1.00)  X 10*3/uL


 


Carbon Dioxide    (21.6-31.8)  mmol/L


 


Est GFR (CKD-EPI)   57 L  (>=60)   


 


BUN/Creatinine Ratio   21.40 H  (12.00-20.00)  Ratio


 


Glucose   128 H  ()  mg/dL


 


Total Protein    (6.2-8.2)  d/dL


 


Albumin   3.4 L  (3.8-4.9)  d/dL


 


Albumin/Globulin Ratio   1.21 L  (1.60-3.17)  Ratio


 


Urine Appearance    (Clear)  


 


Urine WBC    (0-5)  /hpf


 


Urine Bacteria    (None)  /hpf


 


Hyaline Casts    (0-2)  /lpf


 


Urine Mucus    (None)  /hpf














Assessment and Plan


Assessment: 





1.  Syncopal episode





2.  Contusion to left temple area.  Head CT was completed





3.  History of atrial fibrillation maintained on Xarelto





4.  History of ESBL E. coli UTI





5.  History of essential hypertension





6.  History of diabetes mellitus





7.  History of hypothyroidism





8.  Episode of nonsustained V. tach on 10/28/2023








DVT prophylaxis  xarelto.  GI prophylaxis Protonix


Cardiology and neurology service is consulted


Carotid Doppler completed


2-D echo ordered


Urinary analysis ordered


Repeat labs ordered

## 2023-10-28 NOTE — CA
Transthoracic Echo Report 

 Name: Shantell Lee 

 MRN:    [id number] 

 Age:    77     Gender:     F 

 

 :    1945 

 Exam Date:     10/28/2023 14:35 

 Exam Location: [facility] 

 Ht (in):     60     Wt (lb):     130 

 Ordering Physician:        Naveed Sullivan MD 

 Attending/Referring Phys: 

 Technician         [facility] 

 Procedure CPT: 

 Indications:       Syncope 

 

 Cardiac Hx: 

 Technical Quality:      Fair 

 Contrast 1:                                Total Dose (mL): 

 Contrast 2:                                Total Dose (mL): 

 

 MEASUREMENTS  (Male / Female) Normal Values 

 2D ECHO 

 LV Diastolic Diameter PLAX        3.9 cm                4.2 - 5.9 / 3.9 - 5.3 cm 

 LV Systolic Diameter PLAX         3.1 cm                 

 IVS Diastolic Thickness           0.9 cm                0.6 - 1.0 / 0.6 - 0.9 cm 

 LVPW Diastolic Thickness          0.8 cm                0.6 - 1.0 / 0.6 - 0.9 cm 

 LV Relative Wall Thickness        0.4                    

 LVOT Diameter                     2.0 cm                 

 Aortic Root Diameter              3.8 cm                 

 Ascending Aorta Diameter          3.2 cm                 

 

 M-MODE 

 Aortic Root Diameter MM           3.6 cm                 

 LA Systolic Diameter MM           4.1 cm                 

 LA Ao Ratio MM                    1.1                    

 AV Cusp Separation MM             0.8 cm                 

 

 DOPPLER 

 AV Peak Velocity                  118.4 cm/s             

 AV Peak Gradient                  5.6 mmHg               

 AV Mean Velocity                  88.0 cm/s              

 AV Mean Gradient                  3.4 mmHg               

 AV Velocity Time Integral         21.1 cm                

 LVOT Peak Velocity                91.6 cm/s              

 LVOT Peak Gradient                3.4 mmHg               

 LVOT Velocity Time Integral       17.6 cm                

 LVOT Stroke Volume                52.9 cm???               

 LVOT Stroke Volume Index          34.0 ml/m???             

 LVOT Cardiac Index                2289.4 cm???/min???m???      

 AV Area Cont Eq vti               2.5 cm???                

 AV Area Cont Eq pk                2.3 cm???                

 Mitral E Point Velocity           79.2 cm/s              

 MV Deceleration Time              162.7 ms               

 LV E' Lateral Velocity            12.3 cm/s              

 Mitral E to LV E' Lateral Ratio   6.4                    

 LV E' Septal Velocity             8.0 cm/s               

 Mitral E to LV E' Septal Ratio    9.9                    

 TR Peak Velocity                  236.0 cm/s             

 TR Peak Gradient                  22.3 mmHg              

 Right Atrial Pressure             8.0 mmHg               

 Pulmonary Artery Systolic Pressu  30.3 mmHg              

 Right Ventricular Systolic Press  30.3 mmHg              

 

 

 FINDINGS 

 Left Ventricle 

 Left ventricular wall thickness normal. Left ventricular cavity size normal.  

 Normal left ventricular systolic function with no obvious regional wall motion  

 abnormalities. Left ventricular ejection fraction is estimated at 55-60%. 

 

 Right Ventricle 

 Mild right ventricular dilatation. 

 

 Right Atrium 

 Normal right atrial size. 

 

 Left Atrium 

 Severe left atrial dilatation. 

 

 Mitral Valve 

 Mitral valve thickened. Mild mitral regurgitation. 

 

 Aortic Valve 

 Trileaflet aortic valve. Thickening of the aortic valve cusps. No aortic  

 regurgitation. 

 

 Tricuspid Valve 

 Structurally normal tricuspid valve. Mild tricuspid regurgitation. 

 

 Pulmonic Valve 

 Pulmonic valve not well visualized. 

 

 Pericardium 

 No pericardial effusion. 

 

 Aorta 

 Mild aortic dilatation at the level of the sinuses of valsalva (root). Normal  

 size ascending aorta. 

 

 CONCLUSIONS 

 Left ventricular ejection fraction 55-60% 

 RVSP 30 

 Mild mitral regurgitation 

 Mild tricuspid regurgitation 

 Previewed by:  

 Dr. Ayden Phan DO 

 (Electronically Signed) 

 Final Date:      2023 17:44

## 2023-10-28 NOTE — P.PN
Subjective


Progress Note Date: 10/28/23


HISTORY OF PRESENT ILLNESS:  





This is a 77-year-old female with a past medical history significant for atrial 

fibrillation, loop recorder insertion, hypertension, hyperlipidemia, and SVT. 

Patient follows in the office with Dr. Zhong. We have been asked to see the 

patient in consultation for syncope. Patient examined at the bedside.  Patient 

presented to the hospital after a syncopal episode.  The patient states she does

not exactly remember passing out.  She denies having any symptoms prior to this.

 She currently denies any chest pain or pressure.  Denies any shortness of breat

h.  She states there was a family member with her who tried to catch her but she

fell. 





* EKG reveals atrial fibrillation with controlled ventricular rate


* Chest x-ray: Negative for acute process


* Carotid Doppler: Less than 50% stenosis bilaterally


* CT brain: Negative for acute process.  Remote ischemic injuries within the 

  right MCA distribution and right parieto-occipital watershed region.  Left 

  forehead soft tissue contusion.


* Most recent echocardiogram obtained in 2019 revealed ejection fraction 55%, 

  mild TR, mild MR


* Patient underwent Lexiscan stress test in 2019 which was negative for ischemia





10/28/2023


Pt reports she is doing ok, currently getting ECHO done. Denies any chest pain 

or pressure. She had one episode of a slow VT on tele 9 beats. She appears to 

have been asymptomatic. Loop interogation not done yet. 





PHYSICAL EXAM: 


VITAL SIGNS: Reviewed.


GENERAL: Well-developed in no acute distress. 


HEENT: Head is normocephalic. Pupils are equal, round. Sclerae anicteric. Mucous

membranes of the mouth are moist. Neck supple.


LUNGS: Respirations even and unlabored. Lungs essentially clear to auscultation 

bilaterally.


HEART: Irregular rate and rhythm.  S1 and S2 heard.  Systolic murmur noted


ABDOMEN: Soft. Nondistended. Nontender.


EXTREMITIES: Normal range of motion.  No clubbing or cyanosis.  Peripheral 

pulses intact.  No lower extremity edema


NEUROLOGIC: Awake and alert. Oriented x 3. 





ASSESSMENT: 


Syncope


Permanent atrial fibrillation with controlled ventricular rate


Hypertension


Hyperlipidemia


History of loop recorder implantation, [employer], 2022


History of CVA; right MCA territory





PLAN: 


ECHO PENDING


Continue current cardiac medications


Continue oral anticoagulation with Xarelto


Interrogate loop recorder ([employer]). PENDING


Continue telemetry monitoring


Neurology consulted, EEG and MRI ordered.


If ECHO and loop interogation unremarkable, then OK to DC from cardiology 

standpoint. 








Nurse practitioner note has been reviewed by physician. Signing provider agrees 

with the documented findings, assessment, and plan of care. 








Objective





- Vital Signs


Vital signs: 


                                   Vital Signs











Temp  97.9 F   10/28/23 15:12


 


Pulse  71   10/28/23 15:12


 


Resp  14   10/28/23 15:12


 


BP  91/46   10/28/23 15:12


 


Pulse Ox  98   10/28/23 15:12


 


FiO2      








                                 Intake & Output











 10/27/23 10/28/23 10/28/23





 18:59 06:59 18:59


 


Output Total 300 200 


 


Balance -300 -200 


 


Output:   


 


  Urine 300 200 


 


Other:   


 


  Voiding Method Diaper Diaper 


 


  # Voids 4 1 


 


  # Bowel Movements 2  














- Labs


CBC & Chem 7: 


                                 10/28/23 04:32





                                 10/28/23 04:32


Labs: 


                  Abnormal Lab Results - Last 24 Hours (Table)











  10/28/23 10/28/23 Range/Units





  04:32 04:32 


 


RBC  3.92 L   (4.10-5.20)  X 10*6/uL


 


Hgb  10.0 L   (12.0-15.0)  d/dL


 


Hct  32.7 L   (37.2-46.3)  %


 


MCH  25.5 L   (27.0-32.0)  pg


 


MCHC  30.6 L   (32.0-37.0)  d/dL


 


RDW  22.0 H   (11.5-14.5)  %


 


Est GFR (CKD-EPI)   57 L  (>=60)   


 


BUN/Creatinine Ratio   21.40 H  (12.00-20.00)  Ratio


 


Glucose   128 H  ()  mg/dL


 


Albumin   3.4 L  (3.8-4.9)  d/dL


 


Albumin/Globulin Ratio   1.21 L  (1.60-3.17)  Ratio

## 2023-10-29 RX ADMIN — PANTOPRAZOLE SODIUM SCH MG: 40 TABLET, DELAYED RELEASE ORAL at 07:03

## 2023-10-29 RX ADMIN — LEVOTHYROXINE SODIUM SCH MCG: 100 TABLET ORAL at 07:03

## 2023-10-29 RX ADMIN — ATORVASTATIN CALCIUM SCH MG: 40 TABLET, FILM COATED ORAL at 20:12

## 2023-10-29 RX ADMIN — RIVAROXABAN SCH MG: 15 TABLET, FILM COATED ORAL at 08:43

## 2023-10-29 RX ADMIN — DONEPEZIL HYDROCHLORIDE SCH MG: 10 TABLET ORAL at 08:43

## 2023-10-29 RX ADMIN — FUROSEMIDE SCH MG: 20 TABLET ORAL at 08:43

## 2023-10-29 RX ADMIN — Medication PRN MG: at 23:49

## 2023-10-29 RX ADMIN — LOSARTAN POTASSIUM SCH: 50 TABLET, FILM COATED ORAL at 08:43

## 2023-10-29 RX ADMIN — Medication SCH MCG: at 08:43

## 2023-10-29 RX ADMIN — THERA TABS SCH EACH: TAB at 08:43

## 2023-10-29 NOTE — P.PN
Subjective


Progress Note Date: 10/29/23





Shantell Lee, is a 77-year-old female ptient who presented to the ER 

concerns of syncope.  Patient apparently was working with physical therapy when 

she passed out she did strike her head on the left side.  Patient does not 

recall event denies any chest pain leading up to episode.  Patient does have a 

past medical history of atrial fibrillation which she is maintained on Xarelto. 

patient has past medical history of diabetes mellitus, hypertension, 

osteoporosis, thyroid disorder, ESBL E. coli, gastric bypass surgery. Head CT 

was completed showing no acute intracranial process remote ischemic injuries 

within the right MCA distribution and right parietal-occipital watershed region 

nonspecific white when it changes likely secondary to chronic small vessel 

ischemic disease left forehead soft tissue contusion no evidence of cervical 

spine fracture mild degenerative disc disease at C5 and C6.  Chest x-ray 

completed showing no acute cardiopulmonary disease.  Carotid Doppler completed 

showing less than 50% stenosis of bilateral carotid bifurcations.  Troponins 

negative 3.  At this time will consult cardiology and neurology services.  2-D 

echo in urinary analysis will be ordered.





 on 10/27/2023 patient is alert and oriented 3.  2-D echo ordered.  Awaiting 

further input from consulting providers.  Patient denies chest pain or shortness

of breath.  Patient denies nausea vomiting or diarrhea.  Patient denies any 

urinary burning or frequency.  Vital signs temp 97.9, heart rate 78, respiratory

rate 16, blood pressure 121/75 and pulse ox 97% on room aira





On 10/28 2023 patient was seen and examined on the medical floor she is alert 

and oriented 3 in no apparent distress, she is feeling tired and complaining of

headache, otherwise she denies any complaints, there is no fever or chills no 

dizziness, no chest pain or shortness of breath no cough no nausea or vomiting 

no abdominal pain no diarrhea no blood in the stools, no burning with urination 

no frequency or urgency and no hematuria.


Today patient had an episode of nonsustained V. tach 9 beats.  Patient was 

symptomatic at this time, Cardiology will be notified, to assess if cardiac 

arrhythmia is the cause of her episode of syncope.





On 10/29/2023 patient's alert and oriented 3.  MRI has been ordered per 

neurology services.  Per cardiology interrogation of loop recorder pending.  At 

this time patient denies chest pain or shortness of breath.  Patient denies 

nausea vomiting or diarrhea.  Patient denies any urinary burning or frequency.  

Current vital signs temp 98.0, heart rate 67, respiratory rate 17, blood 

pressure 106/69 with a pulse ox 96% on room air





Objective





- Vital Signs


Vital signs: 


                                   Vital Signs











Temp  98.0 F   10/29/23 07:34


 


Pulse  67   10/29/23 07:34


 


Resp  17   10/29/23 07:34


 


BP  106/69   10/29/23 07:34


 


Pulse Ox  96   10/29/23 07:34


 


FiO2      








                                 Intake & Output











 10/28/23 10/29/23 10/29/23





 18:59 06:59 18:59


 


Intake Total 720  


 


Output Total  300 


 


Balance 720 -300 


 


Intake:   


 


  Oral 720  


 


Output:   


 


  Urine  300 


 


Other:   


 


  # Voids 1  


 


  # Bowel Movements 2  














- Exam





In general patient is alert and oriented x-3 in no distress


HEENT head normocephalic and atraumatic


Neck is supple no JVD no goiter no lymphadenopathy no carotid bruit


Chest examination is clear to auscultation no crackles no wheezing


Cardiac exam reveals regular heart sounds S1 and S2 no gallops no murmurs


Abdomen is soft nontender no organomegaly with normal bowel sounds


Extremity exam reveals no edema no cyanosis or clubbing


Neurological examination reveals no gross focal deficits





- Labs


CBC & Chem 7: 


                                 10/28/23 04:32





                                 10/28/23 04:32


Labs: 


                  Abnormal Lab Results - Last 24 Hours (Table)











  10/28/23 10/28/23 Range/Units





  04:32 04:32 


 


RBC  3.92 L   (4.10-5.20)  X 10*6/uL


 


Hgb  10.0 L   (12.0-15.0)  d/dL


 


Hct  32.7 L   (37.2-46.3)  %


 


MCH  25.5 L   (27.0-32.0)  pg


 


MCHC  30.6 L   (32.0-37.0)  d/dL


 


RDW  22.0 H   (11.5-14.5)  %


 


Est GFR (CKD-EPI)   57 L  (>=60)   


 


BUN/Creatinine Ratio   21.40 H  (12.00-20.00)  Ratio


 


Glucose   128 H  ()  mg/dL


 


Albumin   3.4 L  (3.8-4.9)  d/dL


 


Albumin/Globulin Ratio   1.21 L  (1.60-3.17)  Ratio














Assessment and Plan


Assessment: 





1.  Syncopal episode





2.  Contusion to left temple area.  Head CT was completed





3.  History of atrial fibrillation maintained on Xarelto





4.  History of ESBL E. coli UTI





5.  History of essential hypertension





6.  History of diabetes mellitus





7.  History of hypothyroidism





8.  Episode of nonsustained V. tach on 10/28/2023








DVT prophylaxis  xarelto.  GI prophylaxis Protonix


Cardiology and neurology service is consulted


Carotid Doppler completed


2-D echo completed


MRI of the brain ordered per neurology


Per cardiology loop recorder to be anterior gated


Urinary analysis ordered


Repeat labs ordered

## 2023-10-29 NOTE — P.PN
Subjective


Progress Note Date: 10/29/23


HISTORY OF PRESENT ILLNESS:  





This is a 77-year-old female with a past medical history significant for atrial 

fibrillation, loop recorder insertion, hypertension, hyperlipidemia, and SVT. 

Patient follows in the office with Dr. Zhong. We have been asked to see the 

patient in consultation for syncope. Patient examined at the bedside.  Patient 

presented to the hospital after a syncopal episode.  The patient states she does

not exactly remember passing out.  She denies having any symptoms prior to this.

 She currently denies any chest pain or pressure.  Denies any shortness of breat

h.  She states there was a family member with her who tried to catch her but she

fell. 





* EKG reveals atrial fibrillation with controlled ventricular rate


* Chest x-ray: Negative for acute process


* Carotid Doppler: Less than 50% stenosis bilaterally


* CT brain: Negative for acute process.  Remote ischemic injuries within the 

  right MCA distribution and right parieto-occipital watershed region.  Left 

  forehead soft tissue contusion.


* Most recent echocardiogram obtained in 2019 revealed ejection fraction 55%, 

  mild TR, mild MR


* Patient underwent Lexiscan stress test in 2019 which was negative for ischemia





10/28/2023


Pt reports she is doing ok, currently getting ECHO done. Denies any chest pain 

or pressure. She had one episode of a slow VT on tele 9 beats. She appears to 

have been asymptomatic. Loop interrogation not done yet. 





10/29/2023


Pt remains stable. No new issues. Denies chest pain or shortness of breath. Loop

interrogation yesterday revealed 15 hours of a-fib on 10/23/23. ECHO with EF 55-

60%, RVSP 30, mild mitral and tricuspid regurgitation. 





PHYSICAL EXAM: 


VITAL SIGNS: Reviewed.


GENERAL: Well-developed in no acute distress. 


HEENT: Head is normocephalic. Pupils are equal, round. Sclerae anicteric. Mucous

membranes of the mouth are moist. 


LUNGS: Respirations even and unlabored. Lungs essentially clear to auscultation 

bilaterally.


HEART: Irregular rate and rhythm.  S1 and S2 heard.  Systolic murmur noted


EXTREMITIES: Normal range of motion.  No clubbing or cyanosis.  Peripheral 

pulses intact.  No lower extremity edema


NEUROLOGIC: Awake and alert. Oriented x 3. 





ASSESSMENT: 


Syncope


Permanent atrial fibrillation with controlled ventricular rate


Hypertension


Hyperlipidemia


History of loop recorder implantation, [email or web address], 2022


History of CVA; right MCA territory





PLAN: 


ECHO and loop interrogation were unrevealing. 


Continue current cardiac medications


Continue oral anticoagulation with Xarelto


Continue telemetry monitoring


Neurology consulted, EEG and MRI ordered.


OK to DC from cardiology standpoint. Cardiology to sign off, please call with 

any questions or concerns. 








Nurse practitioner note has been reviewed by physician. Signing provider agrees 

with the documented findings, assessment, and plan of care. 








Objective





- Vital Signs


Vital signs: 


                                   Vital Signs











Temp  98.0 F   10/29/23 07:34


 


Pulse  67   10/29/23 07:34


 


Resp  17   10/29/23 07:34


 


BP  106/69   10/29/23 07:34


 


Pulse Ox  96   10/29/23 07:34


 


FiO2      








                                 Intake & Output











 10/28/23 10/29/23 10/29/23





 18:59 06:59 18:59


 


Intake Total 720  


 


Output Total  300 


 


Balance 720 -300 


 


Intake:   


 


  Oral 720  


 


Output:   


 


  Urine  300 


 


Other:   


 


  # Voids 1  


 


  # Bowel Movements 2  














- Labs


CBC & Chem 7: 


                                 10/28/23 04:32





                                 10/28/23 04:32

## 2023-10-30 VITALS
SYSTOLIC BLOOD PRESSURE: 87 MMHG | RESPIRATION RATE: 15 BRPM | DIASTOLIC BLOOD PRESSURE: 50 MMHG | HEART RATE: 76 BPM | TEMPERATURE: 97.9 F

## 2023-10-30 LAB
ALBUMIN SERPL-MCNC: 3.5 D/DL (ref 3.8–4.9)
ALBUMIN/GLOB SERPL: 1.21 RATIO (ref 1.6–3.17)
ALP SERPL-CCNC: 108 U/L (ref 41–126)
ALT SERPL-CCNC: 18 U/L (ref 8–44)
ANION GAP SERPL CALC-SCNC: 13 MMOL/L (ref 4–12)
AST SERPL-CCNC: 23 U/L (ref 13–35)
BASOPHILS # BLD AUTO: 0.06 X 10*3/UL (ref 0–0.1)
BASOPHILS NFR BLD AUTO: 0.7 %
BUN SERPL-SCNC: 18.3 MG/DL (ref 9–27)
BUN/CREAT SERPL: 22.88 RATIO (ref 12–20)
CALCIUM SPEC-MCNC: 9.4 MG/DL (ref 8.7–10.3)
CHLORIDE SERPL-SCNC: 100 MMOL/L (ref 96–109)
CO2 SERPL-SCNC: 26 MMOL/L (ref 21.6–31.8)
EOSINOPHIL # BLD AUTO: 0.35 X 10*3/UL (ref 0.04–0.35)
EOSINOPHIL NFR BLD AUTO: 4.2 %
ERYTHROCYTE [DISTWIDTH] IN BLOOD BY AUTOMATED COUNT: 4.28 X 10*6/UL (ref 4.1–5.2)
ERYTHROCYTE [DISTWIDTH] IN BLOOD: 21.6 % (ref 11.5–14.5)
GLOBULIN SER CALC-MCNC: 2.9 D/DL (ref 1.6–3.3)
GLUCOSE SERPL-MCNC: 98 MG/DL (ref 70–110)
HCT VFR BLD AUTO: 36.5 % (ref 37.2–46.3)
HGB BLD-MCNC: 11 D/DL (ref 12–15)
IMM GRANULOCYTES BLD QL AUTO: 0.4 %
LYMPHOCYTES # SPEC AUTO: 2.18 X 10*3/UL (ref 0.9–5)
LYMPHOCYTES NFR SPEC AUTO: 25.9 %
MCH RBC QN AUTO: 25.7 PG (ref 27–32)
MCHC RBC AUTO-ENTMCNC: 30.1 D/DL (ref 32–37)
MCV RBC AUTO: 85.3 FL (ref 80–97)
MONOCYTES # BLD AUTO: 1.06 X 10*3/UL (ref 0.2–1)
MONOCYTES NFR BLD AUTO: 12.6 %
NEUTROPHILS # BLD AUTO: 4.74 X 10*3/UL (ref 1.8–7.7)
NEUTROPHILS NFR BLD AUTO: 56.2 %
NRBC BLD AUTO-RTO: 0 X 10*3/UL (ref 0–0.01)
PLATELET # BLD AUTO: 284 X 10*3/UL (ref 140–440)
POTASSIUM SERPL-SCNC: 4.2 MMOL/L (ref 3.5–5.5)
PROT SERPL-MCNC: 6.4 D/DL (ref 6.2–8.2)
SODIUM SERPL-SCNC: 139 MMOL/L (ref 135–145)
WBC # BLD AUTO: 8.42 X 10*3/UL (ref 4.5–10)

## 2023-10-30 RX ADMIN — FUROSEMIDE SCH MG: 20 TABLET ORAL at 09:22

## 2023-10-30 RX ADMIN — DONEPEZIL HYDROCHLORIDE SCH MG: 10 TABLET ORAL at 09:22

## 2023-10-30 RX ADMIN — LOSARTAN POTASSIUM SCH MG: 50 TABLET, FILM COATED ORAL at 09:22

## 2023-10-30 RX ADMIN — LEVOTHYROXINE SODIUM SCH MCG: 100 TABLET ORAL at 05:53

## 2023-10-30 RX ADMIN — PANTOPRAZOLE SODIUM SCH MG: 40 TABLET, DELAYED RELEASE ORAL at 05:52

## 2023-10-30 RX ADMIN — THERA TABS SCH EACH: TAB at 09:22

## 2023-10-30 RX ADMIN — RIVAROXABAN SCH MG: 15 TABLET, FILM COATED ORAL at 09:22

## 2023-10-30 RX ADMIN — Medication SCH MCG: at 09:22

## 2023-10-30 NOTE — P.DS
Providers


Date of admission: 


10/26/23 15:25





Expected date of discharge: 10/30/23


Attending physician: 


Naveed Sullivan





Consults: 





                                        





10/27/23 10:14


Consult Physician Routine 


   Consulting Provider: [facility]


   Consult Reason/Comments: Syncopal episode


   Do you want consulting provider notified?: Yes











Primary care physician: 


Naveed Sullivan





[facility] Course: 











Diagnosis on discharge:








1.  Syncopal episode





2.  Contusion to left temple area.  Head CT was completed





3.  History of atrial fibrillation maintained on Xarelto





4.  History of ESBL E. coli UTI





5.  History of essential hypertension





6.  History of diabetes mellitus





7.  History of hypothyroidism





8.  Episode of nonsustained V. tach on 10/28/2023














Hospital course:











Shantell Lee, is a 77-year-old female ptient who presented to the ER 

concerns of syncope.  Patient apparently was working with physical therapy when 

she passed out she did strike her head on the left side.  Patient does not 

recall event denies any chest pain leading up to episode.  Patient does have a 

past medical history of atrial fibrillation which she is maintained on Xarelto. 

patient has past medical history of diabetes mellitus, hypertension, 

osteoporosis, thyroid disorder, ESBL E. coli, gastric bypass surgery. Head CT 

was completed showing no acute intracranial process remote ischemic injuries 

within the right MCA distribution and right parietal-occipital watershed region 

nonspecific white when it changes likely secondary to chronic small vessel 

ischemic disease left forehead soft tissue contusion no evidence of cervical 

spine fracture mild degenerative disc disease at C5 and C6.  Chest x-ray 

completed showing no acute cardiopulmonary disease.  Carotid Doppler completed 

showing less than 50% stenosis of bilateral carotid bifurcations.  Troponins 

negative 3.  At this time will consult cardiology and neurology services.  2-D 

echo in urinary analysis will be ordered.





 on 10/27/2023 patient is alert and oriented 3.  2-D echo ordered.  Awaiting 

further input from consulting providers.  Patient denies chest pain or shortness

of breath.  Patient denies nausea vomiting or diarrhea.  Patient denies any 

urinary burning or frequency.  Vital signs temp 97.9, heart rate 78, respiratory

rate 16, blood pressure 121/75 and pulse ox 97% on room aira





On 10/28 2023 patient was seen and examined on the medical floor she is alert 

and oriented 3 in no apparent distress, she is feeling tired and complaining of

headache, otherwise she denies any complaints, there is no fever or chills no 

dizziness, no chest pain or shortness of breath no cough no nausea or vomiting 

no abdominal pain no diarrhea no blood in the stools, no burning with urination 

no frequency or urgency and no hematuria.


Today patient had an episode of nonsustained V. tach 9 beats.  Patient was 

symptomatic at this time, Cardiology will be notified, to assess if cardiac 

arrhythmia is the cause of her episode of syncope.





On 10/29/2023 patient's alert and oriented 3.  MRI has been ordered per 

neurology services.  Per cardiology interrogation of loop recorder pending.  At 

this time patient denies chest pain or shortness of breath.  Patient denies 

nausea vomiting or diarrhea.  Patient denies any urinary burning or frequency.  

Current vital signs temp 98.0, heart rate 67, respiratory rate 17, blood 

pressure 106/69 with a pulse ox 96% on room air





On 10/30/2023 patient was seen and examined on the medical floor, she is alert 

and oriented 3 in no apparent distress, there is no fever or chills no headache

or dizziness no chest pain no shortness of breath no cough no nausea or vomiting

no abdominal pain no diarrhea and no urinary symptoms.  Cardiology input 

reviewed.  Patient was evaluated by physical therapy and occupational therapy, 

she was cleared to be discharged home.


Cardiology and neurology cleared patient for discharge.  She will be discharged 

home today, she will be followed in our office within 1 week.





Plan - Discharge Summary


New Discharge Prescriptions: 


Continue


   Levothyroxine Sodium [Synthroid] 100 mcg PO DAILY


   Acetaminophen Tab [Tylenol] 500 mg PO Q6H PRN


     PRN Reason: for pain, takes with Tramadol


   Furosemide [Lasix] 40 mg PO DAILY


   Losartan [Cozaar] 50 mg PO DAILY


   traMADol HCL 50 mg PO Q6H PRN


     PRN Reason: Pain


   Melatonin 10 mg PO HS PRN


     PRN Reason: sleep


   Atorvastatin [Lipitor] 40 mg PO HS


   Cholecalciferol [Vitamin D3 (25 Mcg = 1000 Iu)] 25 mcg PO DAILY


   Donepezil [Aricept] 10 mg PO DAILY


   hydrOXYzine pamoate [hydrOXYzine PAMOATE] 25 mg PO HS


   Nitroglycerin Sl Tabs [Nitrostat] 0.4 mg SUBLINGUAL Q5M PRN


     PRN Reason: Chest Pain


   Pantoprazole [Protonix] 40 mg PO DAILY


   Rivaroxaban [Xarelto] 20 mg PO DAILY


   Multivitamins, Thera [Multivitamin (formulary)] 1 tab PO DAILY


   traZODone HCL [Desyrel] 50 mg PO HS


   rOPINIRole HCL [Requip] 2 mg PO HS





Discontinued


   Cefuroxime [Ceftin] 250 mg PO BID


Discharge Medication List





Levothyroxine Sodium [Synthroid] 100 mcg PO DAILY 10/29/17 [History]


Acetaminophen Tab [Tylenol] 500 mg PO Q6H PRN 10/26/23 [History]


Atorvastatin [Lipitor] 40 mg PO HS 10/26/23 [History]


Cholecalciferol [Vitamin D3 (25 Mcg = 1000 Iu)] 25 mcg PO DAILY 10/26/23 

[History]


Donepezil [Aricept] 10 mg PO DAILY 10/26/23 [History]


Furosemide [Lasix] 40 mg PO DAILY 10/26/23 [History]


Losartan [Cozaar] 50 mg PO DAILY 10/26/23 [History]


Melatonin 10 mg PO HS PRN 10/26/23 [History]


Multivitamins, Thera [Multivitamin (formulary)] 1 tab PO DAILY 10/26/23 

[History]


Nitroglycerin Sl Tabs [Nitrostat] 0.4 mg SUBLINGUAL Q5M PRN 10/26/23 [History]


Pantoprazole [Protonix] 40 mg PO DAILY 10/26/23 [History]


Rivaroxaban [Xarelto] 20 mg PO DAILY 10/26/23 [History]


hydrOXYzine pamoate [hydrOXYzine PAMOATE] 25 mg PO HS 10/26/23 [History]


rOPINIRole HCL [Requip] 2 mg PO HS 10/26/23 [History]


traMADol HCL 50 mg PO Q6H PRN 10/26/23 [History]


traZODone HCL [Desyrel] 50 mg PO HS 10/26/23 [History]








Follow up Appointment(s)/Referral(s): 


Naveed Sullivan MD [Primary Care Provider] - 1-2 days


[facility] Visiting Nurse, [NON-STAFF] - 1-2 Days

## 2023-10-30 NOTE — EEG
ELECTROENCEPHALOGRAM REPORT



PREAMBLE:

This is a 77-year-old female with syncopal episode, rule out seizure.  This is a

followup EEG, as the first one showed some abnormalities.



EEG FINDINGS:

This is a 21-channel digital EEG recorded with video component, utilizing 10/20

international system with referential and bipolar montages.  Background consists of

well-developed, well-regulated, moderate-voltage activity in 9 Hz alpha.  Background is

posterior dominant and reactive to eye opening and closing.  Photic driving response

was not clearly seen.  Still myogenic activity was seen in the frontal region.

Intermittent right temporal slowing in dysrhythmic delta and theta was seen

sporadically during the study.  No definitive focal or generalized epileptiform

activity was seen.  Different stages of sleep were not seen.



IMPRESSION:

This is a mildly abnormal EEG due to intermittent focal slowing in the right temporal

region, suggestive of focal cortical neuronal dysfunction.  No definitive epileptiform

activity was seen.  If your suspicion for seizures is high, suggest prolonged, sleep-

deprived EEG.





MMODL / IJN: [id number] / Job#: [id number]

## 2023-10-30 NOTE — P.PN
Subjective


Progress Note Date: 10/30/23








Shantell Lee, is a 77-year-old female ptient who presented to the ER 

concerns of syncope.  Patient apparently was working with physical therapy when 

she passed out she did strike her head on the left side.  Patient does not 

recall event denies any chest pain leading up to episode.  Patient does have a 

past medical history of atrial fibrillation which she is maintained on Xarelto. 

patient has past medical history of diabetes mellitus, hypertension, 

osteoporosis, thyroid disorder, ESBL E. coli, gastric bypass surgery. Head CT 

was completed showing no acute intracranial process remote ischemic injuries 

within the right MCA distribution and right parietal-occipital watershed region 

nonspecific white when it changes likely secondary to chronic small vessel 

ischemic disease left forehead soft tissue contusion no evidence of cervical 

spine fracture mild degenerative disc disease at C5 and C6.  Chest x-ray 

completed showing no acute cardiopulmonary disease.  Carotid Doppler completed 

showing less than 50% stenosis of bilateral carotid bifurcations.  Troponins 

negative 3.  At this time will consult cardiology and neurology services.  2-D 

echo in urinary analysis will be ordered.





 on 10/27/2023 patient is alert and oriented 3.  2-D echo ordered.  Awaiting 

further input from consulting providers.  Patient denies chest pain or shortness

of breath.  Patient denies nausea vomiting or diarrhea.  Patient denies any 

urinary burning or frequency.  Vital signs temp 97.9, heart rate 78, respiratory

rate 16, blood pressure 121/75 and pulse ox 97% on room aira





On 10/28 2023 patient was seen and examined on the medical floor she is alert 

and oriented 3 in no apparent distress, she is feeling tired and complaining of

headache, otherwise she denies any complaints, there is no fever or chills no 

dizziness, no chest pain or shortness of breath no cough no nausea or vomiting 

no abdominal pain no diarrhea no blood in the stools, no burning with urination 

no frequency or urgency and no hematuria.


Today patient had an episode of nonsustained V. tach 9 beats.  Patient was 

symptomatic at this time, Cardiology will be notified, to assess if cardiac 

arrhythmia is the cause of her episode of syncope.





On 10/29/2023 patient's alert and oriented 3.  MRI has been ordered per 

neurology services.  Per cardiology interrogation of loop recorder pending.  At 

this time patient denies chest pain or shortness of breath.  Patient denies 

nausea vomiting or diarrhea.  Patient denies any urinary burning or frequency.  

Current vital signs temp 98.0, heart rate 67, respiratory rate 17, blood 

pressure 106/69 with a pulse ox 96% on room air





On 10/30/2023 patient was seen and examined on the medical floor, she is alert 

and oriented 3 in no apparent distress, there is no fever or chills no headache

or dizziness no chest pain no shortness of breath no cough no nausea or vomiting

no abdominal pain no diarrhea and no urinary symptoms.  Cardiology input 

reviewed.  At this time we are awaiting for notes from physical therapy and 

occupational therapy to see if patient is appropriate for discharge to home 

versus rehab, we are also still awaiting clearance from neurology for discharge.

 We will continue to follow closely.





Objective





- Vital Signs


Vital signs: 


                                   Vital Signs











Temp  98.0 F   10/30/23 07:10


 


Pulse  61   10/30/23 07:10


 


Resp  14   10/30/23 07:10


 


BP  114/71   10/30/23 07:10


 


Pulse Ox  99   10/30/23 07:10


 


FiO2      








                                 Intake & Output











 10/29/23 10/30/23 10/30/23





 18:59 06:59 18:59


 


Intake Total 720  


 


Output Total 600 300 


 


Balance 120 -300 


 


Intake:   


 


  Oral 720  


 


Output:   


 


  Urine 600 300 


 


Other:   


 


  Voiding Method  Diaper 


 


  # Voids 1 2 


 


  # Bowel Movements 1  














- Exam

















In general patient is alert and oriented x-3 in no distress


HEENT head normocephalic and atraumatic


Neck is supple no JVD no goiter no lymphadenopathy no carotid bruit


Chest examination is clear to auscultation no crackles no wheezing


Cardiac exam reveals regular heart sounds S1 and S2 no gallops no murmurs


Abdomen is soft nontender no organomegaly with normal bowel sounds


Extremity exam reveals no edema no cyanosis or clubbing


Neurological examination reveals no gross focal deficits





- Labs


CBC & Chem 7: 


                                 10/30/23 05:25





                                 10/30/23 05:25


Labs: 


                  Abnormal Lab Results - Last 24 Hours (Table)











  10/30/23 10/30/23 Range/Units





  05:25 05:25 


 


Hgb  11.0 L   (12.0-15.0)  d/dL


 


Hct  36.5 L   (37.2-46.3)  %


 


MCH  25.7 L   (27.0-32.0)  pg


 


MCHC  30.1 L   (32.0-37.0)  d/dL


 


RDW  21.6 H   (11.5-14.5)  %


 


Monocytes #  1.06 H   (0.20-1.00)  X 10*3/uL


 


Anion Gap   13.00 H  (4.00-12.00)  mmol/L


 


BUN/Creatinine Ratio   22.88 H  (12.00-20.00)  Ratio


 


Albumin   3.5 L  (3.8-4.9)  d/dL


 


Albumin/Globulin Ratio   1.21 L  (1.60-3.17)  Ratio














Assessment and Plan


Assessment: 





1.  Syncopal episode





2.  Contusion to left temple area.  Head CT was completed





3.  History of atrial fibrillation maintained on Xarelto





4.  History of ESBL E. coli UTI





5.  History of essential hypertension





6.  History of diabetes mellitus





7.  History of hypothyroidism





8.  Episode of nonsustained V. tach on 10/28/2023








DVT prophylaxis  xarelto.  GI prophylaxis Protonix


Cardiology and neurology service is consulted


Carotid Doppler completed


2-D echo ordered


Urinary analysis ordered


Repeat labs ordered

## 2023-10-30 NOTE — P.PN
Subjective


Progress Note Date: 10/30/23





10/30/2023: Patient was seen for a follow-up.  Patient is laying comfortably in 

the bed.  No further syncopal spell or seizure.  I spoke to patient's daughter 

on the phone about the event.  She mentioned that patient was getting physical 

therapy, when she started complaining of not feeling right, feeling hot.  She 

wanted to sit down, but there was no chair.  When the patient got the chair, 

patient could not make it, and fell in her daughter's arms.  She laid her down, 

and patient came to within very short.  Time.  Patient did have some conversion.

 No tongue bite or loss of control of urine.  No previous history of seizure.  

Patient's daughter also mentions that patient does have history of syncopal 

spells in the past as well.





10/28/2023: Patient initially seen by Dr. Jonatan Heart.  Please refer to his note 

for details.  Patient is a 77-year-old female came with a syncopal spell.  EEG 

was done, and Dr. Heart felt the patient has left temporal/parietal sharp waves 

although was technically port because of myogenic artifact.  MRI of the brain 

was ordered.





Patient is laying comfortably in the bed.  Patient states her neck is hurting, 

probably due to the fall.  She says that she has suffered from the falls in the 

past but don't know why she passed out this time.  She has lost a lot of weight,

which she attributes to just losing weight.  She almost looks very cachectic.  

No history of cancer.








Recent testing included:


CT of the head is reported as no acute intracranial process.  Remote ischemic 

injury within the right MCA distribution on the right parietal occipital 

watershed region.  Nonspecific white matter changes, likely secondary due to 

chronic small vessel ischemic disease.  Left forehead soft tissue contusion.  I 

personally reviewed the CT and I agree there is no acute or subacute changes at.


CT cervical spine was reported as no evidence of cervical spine fracture.  Mild 

degenerative disc disease at C5-C6.


Carotid duplex is reported as less than 50% stenosis bilateral carotid 

bifurcation.








Objective





- Vital Signs


Vital signs: 


                                   Vital Signs











Temp  98.0 F   10/30/23 07:10


 


Pulse  61   10/30/23 08:00


 


Resp  14   10/30/23 08:00


 


BP  114/71   10/30/23 07:10


 


Pulse Ox  99   10/30/23 07:10


 


FiO2      








                                 Intake & Output











 10/29/23 10/30/23 10/30/23





 18:59 06:59 18:59


 


Intake Total 720  


 


Output Total 600 300 


 


Balance 120 -300 


 


Intake:   


 


  Oral 720  


 


Output:   


 


  Urine 600 300 


 


Other:   


 


  Voiding Method  Diaper Diaper


 


  # Voids 1 2 


 


  # Bowel Movements 1  














- Exam





On examination patient is an elderly  female, who appears somewhat 

cachectic, with significant weight loss.  She has loose flabby skin folds 

visible.  Patient knows it is October 2023 and that she is in a hospital in [facility] and name of the current president.  Speech and language functions

are normal.  No aphasia or dysarthria.  Patient has contusion to the left temple

region.





On cranial nerve examination, pupils are equal, round and reacting to light, 

visual fields are full on confrontation with no neglect.  Face is symmetric and 

tongue protrudes the midline.  Palatal elevation and sensation normal, hearing 

and shoulder shrug normal.  





On muscle strength testing, there is no pronator drift and the strength is 

normal in arms and legs distally and proximally.





Sensory to touch is equal with no neglect.





No ataxia for finger-to-nose testing.





- Labs


CBC & Chem 7: 


                                 10/30/23 05:25





                                 10/30/23 05:25


Labs: 


                  Abnormal Lab Results - Last 24 Hours (Table)











  10/30/23 10/30/23 Range/Units





  05:25 05:25 


 


Hgb  11.0 L   (12.0-15.0)  d/dL


 


Hct  36.5 L   (37.2-46.3)  %


 


MCH  25.7 L   (27.0-32.0)  pg


 


MCHC  30.1 L   (32.0-37.0)  d/dL


 


RDW  21.6 H   (11.5-14.5)  %


 


Monocytes #  1.06 H   (0.20-1.00)  X 10*3/uL


 


Anion Gap   13.00 H  (4.00-12.00)  mmol/L


 


BUN/Creatinine Ratio   22.88 H  (12.00-20.00)  Ratio


 


Albumin   3.5 L  (3.8-4.9)  d/dL


 


Albumin/Globulin Ratio   1.21 L  (1.60-3.17)  Ratio














Assessment and Plan


Assessment: 





This is a 77-year-old woman with a syncopal episode at home.





Syncopal episode:  Based upon description of patient's daughter, it appears 

syncopal episode rather than seizure.  There was no postictal confusion and 

patient regained her consciousness very shortly after the event.  Seizure is 

definitely a concern given her history of previous CVA.  


History of syncopal episodes in the past of unknown etiology


History of stroke over the right MCA territory


History of atrial fibrillation on Xarelto


Diabetes mellitus


Hypertension


Hypothyroidism











Plan: 





Repeat EEG performed today was mildly abnormal due to intermittent focal slowing

in the right temporal regions, suggestive of focal cortical neuronal 

dysfunction.  No definitive epileptiform activity was seen.  If your suspicion 

for seizures is high, suggest prolonged, sleep deprived EEG.





Initial EEG performed 10/27/2023, which appears to be abnormal since there 

appears to be questionable sharply contoured activity over the left temporal and

parietal region, but is obscured by the myogenic artifact, mostly in the left 

temporal region.  Otherwise no focal slowing, clear epileptiform discharges or 

seizure on the study.  Recommend a repeat EEG study.  Clinical correlation is 

recommended.  Dr. Heart has ordered a repeat EEG for Monday, 10/30/2023.  On my 

review of the EEG from 10/27/2023, there is focal slowing and some sharp-

appearing waves in the right temporal region.


  


MRI of the brain without contrast performed today revealed no evidence of 

intracranial mass or acute/subacute infarct.  Remote injury of the right MCA 

territory, predominantly affecting the right frontal lobe.  Nonspecific white 

matter changes, likely secondary to small vessel ischemic disease.  I personally

reviewed MRI, and agree with the findings.





2-D echo revealed normal left-ventricular wall thickness with no obvious 

regional wall motion abnormalities, with EF 55-60%.  Severe left atrial 

dilation.  Mild aortic dilation at the level of sinuses of Valsalva.  Normal 

size ascending aorta.





Ordered orthostatic vitals.  Patient's blood pressure sometimes runs very low, 

which may also be contributing to the syncopal spell.  Cardiology team is on 

board.





Based upon the information from patient's daughter, it appears patient has 

syncope, not a seizure.  No indication for antiepileptic medication at this 

time.





We'll defer the rest of the medical management to primary and other specialists


Neurologically clear for discharge.

## 2023-10-30 NOTE — MR
EXAMINATION TYPE: MR brain wo con

 

DATE OF EXAM: 10/30/2023 8:09 AM

 

CLINICAL INDICATION:Female, 77 years old with history of stroke.; PHH, Stroke, Fall, hit head

 

COMPARISON: 10/26/2023.  

 

TECHNIQUE: Multi planar, multi sequence imaging was performed through the brain including: T1, T2, In
version recovery, Diffusion weighted imaging, and gradient echo imaging. No gadolinium was given. 

 

FINDINGS: 

Cerebral atrophy with proportional dilation of the ventricles are as ex vacuo dilatation of the right
 lateral ventricle secondary to prior injury. Encephalomalacia of the right frontal lobe is also pres
ent. Sequela from prior injury. Blooming artifact susceptibility weighted imaging within this region 
compatible with hemosiderin deposition. Scattered foci of high T2 signal intensity are seen within th
e periventricular white matter. Midline structures show no abnormality. Diffusion-weighted imaging sh
ows no evidence of restricted diffusion. The susceptibility weighted images do not reveal any evidenc
e for micro-hemorrhage.

 

The bone marrow signal is within normal limits. 

Paranasal sinuses and mastoid air cells: No significant paranasal sinus disease.

Visualized orbits: Bilateral aphakia

 

IMPRESSION:

1. No evidence of intracranial mass or acute/subacute infarct.

2. Remote injury of the right MCA territory predominantly affecting the right frontal lobe. 

3. Nonspecific white matter changes, likely secondary to small vessel ischemic disease.